# Patient Record
Sex: MALE | Race: WHITE | NOT HISPANIC OR LATINO | Employment: FULL TIME | URBAN - METROPOLITAN AREA
[De-identification: names, ages, dates, MRNs, and addresses within clinical notes are randomized per-mention and may not be internally consistent; named-entity substitution may affect disease eponyms.]

---

## 2017-01-10 DIAGNOSIS — D64.9 ANEMIA, UNSPECIFIED: ICD-10-CM

## 2017-01-10 LAB
ERYTHROCYTE [DISTWIDTH] IN BLOOD BY AUTOMATED COUNT: 14.5 % (ref 10–15)
FERRITIN SERPL-MCNC: 16 NG/ML (ref 26–388)
FOLATE SERPL-MCNC: 35.7 NG/ML
HCT VFR BLD AUTO: 44 % (ref 40–53)
HGB BLD-MCNC: 14.1 G/DL (ref 13.3–17.7)
IRON SATN MFR SERPL: 19 % (ref 15–46)
IRON SERPL-MCNC: 64 UG/DL (ref 35–180)
MCH RBC QN AUTO: 28.1 PG (ref 26.5–33)
MCHC RBC AUTO-ENTMCNC: 32 G/DL (ref 31.5–36.5)
MCV RBC AUTO: 88 FL (ref 78–100)
PLATELET # BLD AUTO: 267 10E9/L (ref 150–450)
RBC # BLD AUTO: 5.01 10E12/L (ref 4.4–5.9)
RETICS # AUTO: 43.9 10E9/L (ref 25–95)
RETICS/RBC NFR AUTO: 0.9 % (ref 0.5–2)
TIBC SERPL-MCNC: 337 UG/DL (ref 240–430)
VIT B12 SERPL-MCNC: 636 PG/ML (ref 193–986)
WBC # BLD AUTO: 5.8 10E9/L (ref 4–11)

## 2017-01-10 PROCEDURE — 82728 ASSAY OF FERRITIN: CPT | Performed by: INTERNAL MEDICINE

## 2017-01-10 PROCEDURE — 84165 PROTEIN E-PHORESIS SERUM: CPT | Performed by: INTERNAL MEDICINE

## 2017-01-10 PROCEDURE — 82746 ASSAY OF FOLIC ACID SERUM: CPT | Performed by: INTERNAL MEDICINE

## 2017-01-10 PROCEDURE — 00000402 ZZHCL STATISTIC TOTAL PROTEIN: Performed by: INTERNAL MEDICINE

## 2017-01-10 PROCEDURE — 85045 AUTOMATED RETICULOCYTE COUNT: CPT | Performed by: INTERNAL MEDICINE

## 2017-01-10 PROCEDURE — 83540 ASSAY OF IRON: CPT | Performed by: INTERNAL MEDICINE

## 2017-01-10 PROCEDURE — 83550 IRON BINDING TEST: CPT | Performed by: INTERNAL MEDICINE

## 2017-01-10 PROCEDURE — 36415 COLL VENOUS BLD VENIPUNCTURE: CPT | Performed by: INTERNAL MEDICINE

## 2017-01-10 PROCEDURE — 82607 VITAMIN B-12: CPT | Performed by: INTERNAL MEDICINE

## 2017-01-10 PROCEDURE — 85027 COMPLETE CBC AUTOMATED: CPT | Performed by: INTERNAL MEDICINE

## 2017-01-11 LAB
ALBUMIN SERPL ELPH-MCNC: 4.4 G/DL (ref 3.7–5.1)
ALPHA1 GLOB SERPL ELPH-MCNC: 0.3 G/DL (ref 0.2–0.4)
ALPHA2 GLOB SERPL ELPH-MCNC: 0.6 G/DL (ref 0.5–0.9)
B-GLOBULIN SERPL ELPH-MCNC: 0.8 G/DL (ref 0.6–1)
GAMMA GLOB SERPL ELPH-MCNC: 1.1 G/DL (ref 0.7–1.6)
M PROTEIN SERPL ELPH-MCNC: 0 G/DL
PROT PATTERN SERPL ELPH-IMP: NORMAL

## 2017-01-12 ENCOUNTER — TELEPHONE (OUTPATIENT)
Dept: FAMILY MEDICINE | Facility: CLINIC | Age: 52
End: 2017-01-12

## 2017-01-12 DIAGNOSIS — D50.0 IRON DEFICIENCY ANEMIA DUE TO CHRONIC BLOOD LOSS: Primary | ICD-10-CM

## 2017-01-12 NOTE — TELEPHONE ENCOUNTER
Can we call Felix Coelho and let him know that     Luther Washington,    I have had the opportunity to review your recent results and an interpretation is as follows:  Your follow up complete blood counts and iron studies again show iron deficiency anemia as ferritin remains low.  At this time I would recommend a follow up colonoscopy and upper endoscopy and will give you a referral to gastroenterology to schedule this.   [facility] (for [place] and Alyssa [place]ell, please use the selections above) - [place] [phone]   [email or web address]      Sincerely,  Gerson Lowery MD

## 2017-01-17 NOTE — TELEPHONE ENCOUNTER
Patient has viewed results and PCP's message in [facility].  I called and left a reminder message, as well as phone number to [facility].  Candis Reynaga RN

## 2017-01-18 NOTE — TELEPHONE ENCOUNTER
PCP: FYI    Patient called back to let PCP know that he is scheduled with gastroenterology and he appreciates the calls.    Gabby Angel RN

## 2017-01-23 ENCOUNTER — MYC MEDICAL ADVICE (OUTPATIENT)
Dept: FAMILY MEDICINE | Facility: CLINIC | Age: 52
End: 2017-01-23

## 2017-01-23 DIAGNOSIS — Z12.11 SPECIAL SCREENING FOR MALIGNANT NEOPLASMS, COLON: Primary | ICD-10-CM

## 2017-02-07 ENCOUNTER — TRANSFERRED RECORDS (OUTPATIENT)
Dept: HEALTH INFORMATION MANAGEMENT | Facility: CLINIC | Age: 52
End: 2017-02-07

## 2018-03-06 NOTE — PROGRESS NOTES
Quick Note:    Luther Washington,    I have had the opportunity to review your recent results and an interpretation is as follows:  Your follow up complete blood counts and iron studies again show iron deficiency anemia as ferritin remains low.  At this time I would recommend a follow up colonoscopy and upper endoscopy and will give you a referral to gastroenterology to schedule this. [facility] (for [address] and [place]lyssa Howell, please use the selections above) - [place] [phone] [email or web address]     Sincerely,  Gerson Lowery MD  ______   Yes

## 2018-03-29 DIAGNOSIS — J30.2 CHRONIC SEASONAL ALLERGIC RHINITIS, UNSPECIFIED TRIGGER: Primary | ICD-10-CM

## 2018-03-30 NOTE — TELEPHONE ENCOUNTER
Left VM. NEED OV WITH PCP      Last Written Prescription Date:  11/16/2016  Last Fill Quantity: 90,  # refills: 3   Last office visit: 11/16/2016 with prescribing provider:     Future Office Visit:      Requested Prescriptions   Pending Prescriptions Disp Refills     ALLEGRA-D ALLERGY & CONGESTION  MG per 12 hr tablet [Pharmacy Med Name: ALLEGRA-D 12HR TABLETS 30'S (OTC)] 90 tablet 0     Sig: TAKE 1 TABLET BY MOUTH DAILY.    There is no refill protocol information for this order

## 2018-04-03 RX ORDER — FEXOFENADINE HYDROCHLORIDE AND PSEUDOEPHEDRINE HYDROCHLORIDE 60; 120 MG/1; MG/1
TABLET, FILM COATED, EXTENDED RELEASE ORAL
Qty: 30 TABLET | Refills: 0 | Status: SHIPPED | OUTPATIENT
Start: 2018-04-03 | End: 2018-04-27

## 2018-04-03 NOTE — TELEPHONE ENCOUNTER
Routing refill request to provider for review/approval because:  Drug not on the [facility] refill protocol controlled substance    Paola Whitehead, RN  [employer]

## 2018-04-27 ENCOUNTER — OFFICE VISIT (OUTPATIENT)
Dept: FAMILY MEDICINE | Facility: CLINIC | Age: 53
End: 2018-04-27
Payer: COMMERCIAL

## 2018-04-27 ENCOUNTER — TELEPHONE (OUTPATIENT)
Dept: FAMILY MEDICINE | Facility: CLINIC | Age: 53
End: 2018-04-27

## 2018-04-27 DIAGNOSIS — R73.01 IFG (IMPAIRED FASTING GLUCOSE): ICD-10-CM

## 2018-04-27 DIAGNOSIS — R07.0 THROAT PAIN: ICD-10-CM

## 2018-04-27 DIAGNOSIS — Z00.00 ROUTINE GENERAL MEDICAL EXAMINATION AT A HEALTH CARE FACILITY: Primary | ICD-10-CM

## 2018-04-27 DIAGNOSIS — J30.2 CHRONIC SEASONAL ALLERGIC RHINITIS, UNSPECIFIED TRIGGER: ICD-10-CM

## 2018-04-27 DIAGNOSIS — K12.0 ORAL APHTHAE: ICD-10-CM

## 2018-04-27 DIAGNOSIS — D50.0 IRON DEFICIENCY ANEMIA DUE TO CHRONIC BLOOD LOSS: ICD-10-CM

## 2018-04-27 PROBLEM — D36.9 TUBULAR ADENOMA: Status: ACTIVE | Noted: 2018-04-27

## 2018-04-27 LAB
ALBUMIN SERPL-MCNC: 3.9 G/DL (ref 3.4–5)
ALP SERPL-CCNC: 66 U/L (ref 40–150)
ALT SERPL W P-5'-P-CCNC: 27 U/L (ref 0–70)
ANION GAP SERPL CALCULATED.3IONS-SCNC: 6 MMOL/L (ref 3–14)
AST SERPL W P-5'-P-CCNC: 14 U/L (ref 0–45)
BILIRUB SERPL-MCNC: 0.4 MG/DL (ref 0.2–1.3)
BUN SERPL-MCNC: 12 MG/DL (ref 7–30)
CALCIUM SERPL-MCNC: 8.9 MG/DL (ref 8.5–10.1)
CHLORIDE SERPL-SCNC: 107 MMOL/L (ref 94–109)
CHOLEST SERPL-MCNC: 204 MG/DL
CO2 SERPL-SCNC: 29 MMOL/L (ref 20–32)
CREAT SERPL-MCNC: 1.05 MG/DL (ref 0.66–1.25)
DEPRECATED S PYO AG THROAT QL EIA: NORMAL
ERYTHROCYTE [DISTWIDTH] IN BLOOD BY AUTOMATED COUNT: 14.1 % (ref 10–15)
FERRITIN SERPL-MCNC: 46 NG/ML (ref 26–388)
GFR SERPL CREATININE-BSD FRML MDRD: 75 ML/MIN/1.7M2
GLUCOSE SERPL-MCNC: 99 MG/DL (ref 70–99)
HBA1C MFR BLD: 5.6 % (ref 0–5.6)
HCT VFR BLD AUTO: 44.9 % (ref 40–53)
HDLC SERPL-MCNC: 44 MG/DL
HGB BLD-MCNC: 14.7 G/DL (ref 13.3–17.7)
IRON SATN MFR SERPL: 22 % (ref 15–46)
IRON SERPL-MCNC: 67 UG/DL (ref 35–180)
LDLC SERPL CALC-MCNC: 134 MG/DL
MCH RBC QN AUTO: 30.1 PG (ref 26.5–33)
MCHC RBC AUTO-ENTMCNC: 32.7 G/DL (ref 31.5–36.5)
MCV RBC AUTO: 92 FL (ref 78–100)
NONHDLC SERPL-MCNC: 160 MG/DL
PLATELET # BLD AUTO: 291 10E9/L (ref 150–450)
POTASSIUM SERPL-SCNC: 4.6 MMOL/L (ref 3.4–5.3)
PROT SERPL-MCNC: 7.5 G/DL (ref 6.8–8.8)
PSA SERPL-ACNC: 2.9 UG/L (ref 0–4)
RBC # BLD AUTO: 4.88 10E12/L (ref 4.4–5.9)
SODIUM SERPL-SCNC: 142 MMOL/L (ref 133–144)
SPECIMEN SOURCE: NORMAL
TIBC SERPL-MCNC: 303 UG/DL (ref 240–430)
TRIGL SERPL-MCNC: 129 MG/DL
WBC # BLD AUTO: 7 10E9/L (ref 4–11)

## 2018-04-27 PROCEDURE — 83036 HEMOGLOBIN GLYCOSYLATED A1C: CPT | Performed by: INTERNAL MEDICINE

## 2018-04-27 PROCEDURE — 83550 IRON BINDING TEST: CPT | Performed by: INTERNAL MEDICINE

## 2018-04-27 PROCEDURE — 87081 CULTURE SCREEN ONLY: CPT | Performed by: INTERNAL MEDICINE

## 2018-04-27 PROCEDURE — 87880 STREP A ASSAY W/OPTIC: CPT | Performed by: INTERNAL MEDICINE

## 2018-04-27 PROCEDURE — 80053 COMPREHEN METABOLIC PANEL: CPT | Performed by: INTERNAL MEDICINE

## 2018-04-27 PROCEDURE — 85027 COMPLETE CBC AUTOMATED: CPT | Performed by: INTERNAL MEDICINE

## 2018-04-27 PROCEDURE — 99396 PREV VISIT EST AGE 40-64: CPT | Performed by: INTERNAL MEDICINE

## 2018-04-27 PROCEDURE — G0103 PSA SCREENING: HCPCS | Performed by: INTERNAL MEDICINE

## 2018-04-27 PROCEDURE — 82728 ASSAY OF FERRITIN: CPT | Performed by: INTERNAL MEDICINE

## 2018-04-27 PROCEDURE — 80061 LIPID PANEL: CPT | Performed by: INTERNAL MEDICINE

## 2018-04-27 PROCEDURE — 83540 ASSAY OF IRON: CPT | Performed by: INTERNAL MEDICINE

## 2018-04-27 PROCEDURE — 36415 COLL VENOUS BLD VENIPUNCTURE: CPT | Performed by: INTERNAL MEDICINE

## 2018-04-27 RX ORDER — FEXOFENADINE HCL AND PSEUDOEPHEDRINE HCI 60; 120 MG/1; MG/1
TABLET, EXTENDED RELEASE ORAL
Qty: 90 TABLET | Refills: 3 | Status: SHIPPED | OUTPATIENT
Start: 2018-04-27 | End: 2020-08-25

## 2018-04-27 RX ORDER — TRIAMCINOLONE ACETONIDE 0.1 %
PASTE (GRAM) DENTAL
Qty: 15 G | Refills: 2 | Status: SHIPPED | OUTPATIENT
Start: 2018-04-27 | End: 2020-08-25

## 2018-04-27 NOTE — TELEPHONE ENCOUNTER
I forgot to record BP in chart this morning so left a message asking pt if he recalls.      Brit JEAN MA

## 2018-04-27 NOTE — PROGRESS NOTES
SUBJECTIVE:   CC: Felix Coelho is an 52 year old male who presents for preventative health visit.     Healthy Habits:    Do you get at least three servings of calcium containing foods daily (dairy, green leafy vegetables, etc.)? yes    Amount of exercise or daily activities, outside of work: 4 day(s) per week    Problems taking medications regularly No    Medication side effects: No    Have you had an eye exam in the past two years? no    Do you see a dentist twice per year? yes    Do you have sleep apnea, excessive snoring or daytime drowsiness?no      Today's PHQ-2 Score:   PHQ-2 ( 1999 [employer]) 11/16/2016 11/14/2016   Q1: Little interest or pleasure in doing things 0 -   Q2: Feeling down, depressed or hopeless 0 -   PHQ-2 Score 0 -   Q1: Little interest or pleasure in doing things - Not at all   Q2: Feeling down, depressed or hopeless - Not at all   PHQ-2 Score - 0       Abuse: Current or Past(Physical, Sexual or Emotional)- No  Do you feel safe in your environment - Yes    Social History   Substance Use Topics     Smoking status: Never Smoker     Smokeless tobacco: Never Used     Alcohol use 0.0 oz/week     0 Standard drinks or equivalent per week      Comment: 1 glass of wine 3-4 times a week      If you drink alcohol do you typically have >3 drinks per day or >7 drinks per week? No                      Last PSA:   PSA   Date Value Ref Range Status   11/04/2016 2.05 0 - 4 ug/L Final     Comment:     Assay Method:  Chemiluminescence using Siemens Vista analyzer       Reviewed orders with patient. 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  No associated coughing or wheezing.  No fevers or chills.  Tylenol helps.  Gastroesophageal reflux does not seem to be triggering.  No other oral ulcers.  Wife has chest cough and 12 year old had a recent head cold    ROS:  C: NEGATIVE for fever, chills, change in weight  I: NEGATIVE for worrisome rashes, moles or lesions  E: NEGATIVE for vision changes or irritation  ENT: as above   R: NEGATIVE for significant cough or SOB  CV: NEGATIVE for chest pain, palpitations or peripheral edema  GI: NEGATIVE for nausea, abdominal pain, heartburn, or change in bowel habits   male: negative for dysuria, hematuria, decreased urinary stream, erectile dysfunction, urethral discharge  M: NEGATIVE for significant arthralgias or myalgia  N: NEGATIVE for weakness, dizziness or paresthesias  P: NEGATIVE for changes in mood or affect    OBJECTIVE:   Pulse 91  Ht 6' 2\" (1.88 m)  Wt 205 lb (93 kg)  SpO2 97%  BMI 26.32 kg/m2  EXAM:  GENERAL: healthy, alert and no distress  EYES: Eyes grossly normal to inspection, PERRL and conjunctivae and sclerae normal  HENT: ear canals and TM's normal, nose and mouth without ulcers or lesions  NECK: no adenopathy, no asymmetry, masses, or scars and thyroid normal to palpation  RESP: lungs clear to auscultation - no rales, rhonchi or wheezes  CV: regular rate and rhythm, normal S1 S2, no S3 or S4, no murmur, click or rub, no peripheral edema and peripheral pulses strong  ABDOMEN: soft, nontender, no hepatosplenomegaly, no masses and bowel sounds normal  RECTAL: normal sphincter tone, no rectal masses, prostate normal size, smooth, nontender without nodules or masses  MS: no gross musculoskeletal defects noted, no edema  SKIN: no suspicious lesions or rashes  NEURO: Normal strength and tone, mentation intact and speech normal  PSYCH: mentation appears normal, " "affect normal/bright    ASSESSMENT/PLAN:   (Z00.00) Routine general medical examination at a health care facility  (primary encounter diagnosis)  Comment: For routine exam, we will draw labs as ordered, cholesterol, diabetes mellitus check, liver function, renal function, PSA and refer for colonoscopy.  We will also update vaccination history.  Shingrix vaccine is now available.  Check with your insurance to see if it is covered  Plan: CBC with platelets, Lipid panel reflex to         direct LDL Fasting, Prostate spec antigen         screen, Comprehensive metabolic panel            (J30.2) Chronic seasonal allergic rhinitis, unspecified trigger  Comment:   Plan: fexofenadine-pseudoePHEDrine (ALLEGRA-D ALLERGY        & CONGESTION)  MG per 12 hr tablet            (K12.0) Oral aphthae  Comment:   Plan: triamcinolone (KENALOG) 0.1 % paste            (R73.01) IFG (impaired fasting glucose)  Comment: check hemoglobin A1c ( 3 month average blood glucose)  Plan:     (D50.0) Iron deficiency anemia due to chronic blood loss  Comment: Recheck ferritin today and iron levels  Plan:     (R07.0) Throat pain  Comment: recommend strep test though  Plan: Strep, Rapid Screen                   COUNSELING:  Reviewed preventive health counseling, as reflected in patient instructions       reports that he has never smoked. He has never used smokeless tobacco.    Estimated body mass index is 25.94 kg/(m^2) as calculated from the following:    Height as of 11/16/16: 6' 2\" (1.88 m).    Weight as of 11/16/16: 202 lb (91.6 kg).       Counseling Resources:  ATP IV Guidelines  Pooled Cohorts Equation Calculator  FRAX Risk Assessment  ICSI Preventive Guidelines  Dietary Guidelines for Americans, 2010  USDA's MyPlate  ASA Prophylaxis  Lung CA Screening    Gerson Lowery MD, MD  [facility]  "

## 2018-04-27 NOTE — MR AVS SNAPSHOT
After Visit Summary   4/27/2018    Felix Coelho    MRN: [id number]           Patient Information     Date Of Birth          1965        Visit Information        Provider Department      4/27/2018 7:30 AM Gerson Lowery MD [facility]        Today's Diagnoses     Routine general medical examination at a health care facility    -  1    Chronic seasonal allergic rhinitis, unspecified trigger        Oral aphthae        IFG (impaired fasting glucose)        Iron deficiency anemia due to chronic blood loss        Throat pain          Care Instructions      Preventive Health Recommendations  Male Ages 50 - 64    Yearly exam:             See your health care provider every year in order to  o   Review health changes.   o   Discuss preventive care.    o   Review your medicines if your doctor has prescribed any.     Have a cholesterol test every 5 years, or more frequently if you are at risk for high cholesterol/heart disease.     Have a diabetes test (fasting glucose) every three years. If you are at risk for diabetes, you should have this test more often.     Have a colonoscopy at age 50, or have a yearly FIT test (stool test). These exams will check for colon cancer.      Talk with your health care provider about whether or not a prostate cancer screening test (PSA) is right for you.    You should be tested each year for STDs (sexually transmitted diseases), if you re at risk.     Shots: Get a flu shot each year. Get a tetanus shot every 10 years.     Nutrition:    Eat at least 5 servings of fruits and vegetables daily.     Eat whole-grain bread, whole-wheat pasta and brown rice instead of white grains and rice.     Talk to your provider about Calcium and Vitamin D.     Lifestyle    Exercise for at least 150 minutes a week (30 minutes a day, 5 days a week). This will help you control your weight and prevent disease.     Limit alcohol to one drink per day.     No smoking.      Wear sunscreen to prevent skin cancer.     See your dentist every six months for an exam and cleaning.     See your eye doctor every 1 to 2 years.  (Z00.00) Routine general medical examination at a health care facility  (primary encounter diagnosis)  Comment: For routine exam, we will draw labs as ordered, cholesterol, diabetes mellitus check, liver function, renal function, PSA and refer for colonoscopy.  We will also update vaccination history.  Shingrix vaccine is now available.  Check with your insurance to see if it is covered  Plan: CBC with platelets, Lipid panel reflex to         direct LDL Fasting, Prostate spec antigen         screen, Comprehensive metabolic panel            (J30.2) Chronic seasonal allergic rhinitis, unspecified trigger  Comment:   Plan: fexofenadine-pseudoePHEDrine (ALLEGRA-D ALLERGY        & CONGESTION)  MG per 12 hr tablet            (K12.0) Oral aphthae  Comment:   Plan: triamcinolone (KENALOG) 0.1 % paste            (R73.01) IFG (impaired fasting glucose)  Comment: check hemoglobin A1c ( 3 month average blood glucose)  Plan:     (D50.0) Iron deficiency anemia due to chronic blood loss  Comment: Recheck ferritin today and iron levels  Plan:     (R07.0) Throat pain  Comment: recommend strep test though  Plan: Strep, Rapid Screen                     Follow-ups after your visit        Who to contact     If you have questions or need follow up information about today's clinic visit or your schedule please contact [facility] directly at [phone].  Normal or non-critical lab and imaging results will be communicated to you by MyChart, letter or phone within 4 business days after the clinic has received the results. If you do not hear from us within 7 days, please contact the clinic through MyChart or phone. If you have a critical or abnormal lab result, we will notify you by phone as soon as possible.  Submit refill requests through [employer] or call your pharmacy and  "they will forward the refill request to us. Please allow 3 business days for your refill to be completed.          Additional Information About Your Visit        [email or web address] Information     [facility] gives you secure access to your electronic health record. If you see a primary care provider, you can also send messages to your care team and make appointments. If you have questions, please call your primary care clinic.  If you do not have a primary care provider, please call [phone] and they will assist you.        Care EveryWhere ID     This is your Care EveryWhere ID. This could be used by other organizations to access your [facility] medical records  [id number]        Your Vitals Were     Pulse Height Pulse Oximetry BMI (Body Mass Index)          91 6' 2\" (1.88 m) 97% 26.32 kg/m2         Blood Pressure from Last 3 Encounters:   11/16/16 151/89   08/10/15 102/68   07/30/13 132/83    Weight from Last 3 Encounters:   04/27/18 205 lb (93 kg)   11/16/16 202 lb (91.6 kg)   08/10/15 199 lb 12.8 oz (90.6 kg)              We Performed the Following     CBC with platelets     Comprehensive metabolic panel     Ferritin     Hemoglobin A1c     Iron and iron binding capacity     Lipid panel reflex to direct LDL Fasting     Prostate spec antigen screen     Strep, Rapid Screen          Today's Medication Changes          These changes are accurate as of 4/27/18  8:03 AM.  If you have any questions, ask your nurse or doctor.               These medicines have changed or have updated prescriptions.        Dose/Directions    fexofenadine-pseudoePHEDrine  MG per 12 hr tablet   Commonly known as:  ALLEGRA-D ALLERGY & CONGESTION   This may have changed:  See the new instructions.   Used for:  Chronic seasonal allergic rhinitis, unspecified trigger   Changed by:  Gerson Lowery MD        TAKE 1 TABLET BY MOUTH DAILY.   Quantity:  90 tablet   Refills:  3            Where to get your medicines      These medications were " sent to [facility] [address] [place], [place] - [address] [place] [facility] [address], [facility] [address]     Phone:  [phone]     triamcinolone 0.1 % paste         Some of these will need a paper prescription and others can be bought over the counter.  Ask your nurse if you have questions.     Bring a paper prescription for each of these medications     fexofenadine-pseudoePHEDrine  MG per 12 hr tablet                Primary Care Provider Office Phone # Fax #    Gerson Lowery [phone] [address] [address]        Equal Access to Services     SHELBY RILEY : Hadii aad ku hadasho Sojomarali, waaxda luqadaha, qaybta kaalmada adeegyada, jr nix. So [facility] [phone].    ATENCIÓN: Si habla español, tiene a villatoro disposición servicios gratuitos de asistencia lingüística. [facility] [phone].    We comply with applicable federal civil rights laws and [place] laws. We do not discriminate on the basis of race, color, national origin, age, disability, sex, sexual orientation, or gender identity.            Thank you!     Thank you for choosing [facility]  for your care. Our goal is always to provide you with excellent care. Hearing back from our patients is one way we can continue to improve our services. Please take a few minutes to complete the written survey that you may receive in the mail after your visit with us. Thank you!             Your Updated Medication List - Protect others around you: Learn how to safely use, store and throw away your medicines at www.disposemymeds.org.          This list is accurate as of 4/27/18  8:03 AM.  Always use your most recent med list.                   Brand Name Dispense Instructions for use Diagnosis    ALEVE 220 MG tablet   Generic drug:  naproxen sodium      Take 440 mg by mouth every 12 hours as needed.    Preop general physical  exam       fexofenadine-pseudoePHEDrine  MG per 12 hr tablet    ALLEGRA-D ALLERGY & CONGESTION    90 tablet    TAKE 1 TABLET BY MOUTH DAILY.    Chronic seasonal allergic rhinitis, unspecified trigger       ibuprofen 200 MG capsule      Take 200 mg by mouth as needed for fever.        MULTIVITAMINS PO      Take 1 tablet by mouth three times a week.    Preop general physical exam       triamcinolone 0.1 % paste    KENALOG    15 g    use as directed for canker sores    Oral aphthae

## 2018-04-27 NOTE — PROGRESS NOTES
Luther Washington,    I had the opportunity to review your recent labs and a summary of your labs reads as follows:    Your complete blood counts show no sign of anemia, normal white blood cell count and platelets.  Your iron studies also returned within normal limits  Your comprehensive metabolic panel showed normal renal function, normal liver function, and normal fasting blood glucose indicating no evidence of diabetes mellitus.  Your fasting lipid panel show  - normal HDL (good) cholesterol -as your goal is greater than 40  - low LDL (bad) cholesterol as your goal is less than 160  - normal triglyceride levels  Your PSA level is stable indicating no evidence of prostate cancer   Your strep test came back negative as well -for now I would recommend conservative treatment      Congratulaions on your excellent results         Sincerely,  Gerson Lowery MD

## 2018-04-27 NOTE — PATIENT INSTRUCTIONS
Preventive Health Recommendations  Male Ages 50 - 64    Yearly exam:             See your health care provider every year in order to  o   Review health changes.   o   Discuss preventive care.    o   Review your medicines if your doctor has prescribed any.     Have a cholesterol test every 5 years, or more frequently if you are at risk for high cholesterol/heart disease.     Have a diabetes test (fasting glucose) every three years. If you are at risk for diabetes, you should have this test more often.     Have a colonoscopy at age 50, or have a yearly FIT test (stool test). These exams will check for colon cancer.      Talk with your health care provider about whether or not a prostate cancer screening test (PSA) is right for you.    You should be tested each year for STDs (sexually transmitted diseases), if you re at risk.     Shots: Get a flu shot each year. Get a tetanus shot every 10 years.     Nutrition:    Eat at least 5 servings of fruits and vegetables daily.     Eat whole-grain bread, whole-wheat pasta and brown rice instead of white grains and rice.     Talk to your provider about Calcium and Vitamin D.     Lifestyle    Exercise for at least 150 minutes a week (30 minutes a day, 5 days a week). This will help you control your weight and prevent disease.     Limit alcohol to one drink per day.     No smoking.     Wear sunscreen to prevent skin cancer.     See your dentist every six months for an exam and cleaning.     See your eye doctor every 1 to 2 years.  (Z00.00) Routine general medical examination at a health care facility  (primary encounter diagnosis)  Comment: For routine exam, we will draw labs as ordered, cholesterol, diabetes mellitus check, liver function, renal function, PSA and refer for colonoscopy.  We will also update vaccination history.  Shingrix vaccine is now available.  Check with your insurance to see if it is covered  Plan: CBC with platelets, Lipid panel reflex to         direct  LDL Fasting, Prostate spec antigen         screen, Comprehensive metabolic panel            (J30.2) Chronic seasonal allergic rhinitis, unspecified trigger  Comment:   Plan: fexofenadine-pseudoePHEDrine (ALLEGRA-D ALLERGY        & CONGESTION)  MG per 12 hr tablet            (K12.0) Oral aphthae  Comment:   Plan: triamcinolone (KENALOG) 0.1 % paste            (R73.01) IFG (impaired fasting glucose)  Comment: check hemoglobin A1c ( 3 month average blood glucose)  Plan:     (D50.0) Iron deficiency anemia due to chronic blood loss  Comment: Recheck ferritin today and iron levels  Plan:     (R07.0) Throat pain  Comment: recommend strep test though  Plan: Strep, Rapid Screen

## 2018-04-28 LAB
BACTERIA SPEC CULT: NORMAL
SPECIMEN SOURCE: NORMAL

## 2018-04-30 VITALS
BODY MASS INDEX: 26.31 KG/M2 | HEIGHT: 74 IN | HEART RATE: 91 BPM | SYSTOLIC BLOOD PRESSURE: 121 MMHG | DIASTOLIC BLOOD PRESSURE: 98 MMHG | OXYGEN SATURATION: 97 % | WEIGHT: 205 LBS

## 2020-02-10 ENCOUNTER — HEALTH MAINTENANCE LETTER (OUTPATIENT)
Age: 55
End: 2020-02-10

## 2020-05-15 ENCOUNTER — OFFICE VISIT (OUTPATIENT)
Dept: FAMILY MEDICINE | Facility: CLINIC | Age: 55
End: 2020-05-15
Payer: COMMERCIAL

## 2020-05-15 VITALS
OXYGEN SATURATION: 100 % | HEIGHT: 74 IN | HEART RATE: 71 BPM | DIASTOLIC BLOOD PRESSURE: 88 MMHG | BODY MASS INDEX: 26.62 KG/M2 | WEIGHT: 207.4 LBS | SYSTOLIC BLOOD PRESSURE: 132 MMHG

## 2020-05-15 DIAGNOSIS — N50.89 MASS OF LEFT TESTICLE: Primary | ICD-10-CM

## 2020-05-15 PROCEDURE — 99214 OFFICE O/P EST MOD 30 MIN: CPT | Mod: 95 | Performed by: INTERNAL MEDICINE

## 2020-05-15 ASSESSMENT — MIFFLIN-ST. JEOR: SCORE: 1860.51

## 2020-05-15 NOTE — PROGRESS NOTES
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  History of left inguinal hernia.  1st cousin on mothers side with possible germ cell tumor.    Patient Active Problem List   Diagnosis     Chronic rhinitis     Oral aphthae     External hemorrhoids     Kidney stone     CARDIOVASCULAR SCREENING; LDL GOAL LESS THAN 130     Left inguinal hernia     Positive Test for Malignant Hyperthermia     IFG (impaired fasting glucose)     Iron deficiency anemia due to chronic blood loss     Tubular adenoma     No past surgical history on file.    Social History     Tobacco Use     Smoking status: Never Smoker     Smokeless tobacco: Never Used   Substance Use Topics     Alcohol use: Yes     Alcohol/week: 0.0 standard drinks     Comment: 1 glass of wine 2-3 times a week     Family History   Problem Relation Age of Onset     C.A.D. Father 55        triple bi pass surgery  at age 55     Depression Sister      Cardiovascular Paternal Uncle         malignant hyperthermia     Cancer Paternal Grandfather         penile     Cancer Maternal Grandfather         neck         Current Outpatient Medications   Medication Sig Dispense Refill     fexofenadine-pseudoePHEDrine (ALLEGRA-D ALLERGY & CONGESTION)  MG per 12 hr tablet TAKE 1 TABLET BY MOUTH DAILY. 90 tablet 3     Ibuprofen 200 MG capsule Take 200 mg by mouth as needed for fever.       Multiple Vitamin (MULTIVITAMINS PO) Take 1 tablet by mouth three times a week.       naproxen sodium (ALEVE) 220 MG tablet Take 440 mg by mouth every 12 hours as needed.       triamcinolone (KENALOG) 0.1 % paste use as directed for canker sores 15 g 2     Allergies   Allergen Reactions     Halothane Other (See Comments)      "Condition is malignant hypothermia          Reviewed and updated as needed this visit by Provider         Review of Systems   No fevers or chills, dysuria or hematuria       Objective    /88 (BP Location: Right arm, Patient Position: Sitting, Cuff Size: Adult Regular)   Pulse 71   Ht 1.88 m (6' 2\")   Wt 94.1 kg (207 lb 6.4 oz)   SpO2 100%   BMI 26.63 kg/m    Body mass index is 26.63 kg/m .  Physical Exam   GEN:  Very healthy appearing man, well nourished, no distress, comfortable  :  Firm mass adjacent to left testes measuring about 0.5 cm X 0.5cm, minimally tender    Ultrasound pending         Assessment & Plan       ICD-10-CM    1. Mass of left testicle  N50.89 US Testicular and Scrotum   Mass of unclear nature by exam  I recommended a scrotal ultrasound within a week to further characterize         Return in about 1 week (around 5/22/2020) for schedule ultrasound within one week.  Further follow will be determined base on ultrasound finding.    Denis Pate MD  [facility]        "

## 2020-05-18 ENCOUNTER — HOSPITAL ENCOUNTER (OUTPATIENT)
Dept: ULTRASOUND IMAGING | Facility: CLINIC | Age: 55
Discharge: HOME | End: 2020-05-18
Attending: INTERNAL MEDICINE | Admitting: INTERNAL MEDICINE
Payer: COMMERCIAL

## 2020-05-18 DIAGNOSIS — N50.89 MASS OF LEFT TESTICLE: ICD-10-CM

## 2020-05-18 DIAGNOSIS — N45.1 EPIDIDYMITIS: Primary | ICD-10-CM

## 2020-05-18 PROCEDURE — 93976 VASCULAR STUDY: CPT

## 2020-05-18 RX ORDER — LEVOFLOXACIN 500 MG/1
500 TABLET, FILM COATED ORAL DAILY
Qty: 10 TABLET | Refills: 0 | Status: SHIPPED | OUTPATIENT
Start: 2020-05-18 | End: 2020-08-25

## 2020-05-18 NOTE — LETTER
May 18, 2020      Felix Coelho  [address] [facility] [address]        Dear ,    We are writing to inform you of your test results.    The following letter pertains to your most recent diagnostic tests:     As we discussed by phone your scrotal symptoms are from an infection within the scrotum called epididymitis.  Take the antibiotic levofloxacin as directed.  Stop the levofloxacin and inform us of any new tendon symptoms while taking levofloxacin.  Call if your symptoms persist upon completing the course of levofloxacin.     Resulted Orders   US Testicular & Scrotum w Doppler Ltd    Narrative    EXAMINATION: US TESTICULAR AND SCROTAL, 5/18/2020 7:56 AM     COMPARISON: None.    HISTORY: Palpable left testicular mass with occasional tenderness for  one week    TECHNIQUE: The scrotum was scanned in standard fashion with  specialized ultrasound transducer(s) using both grey scale and limited  color Doppler techniques.    Findings:  3 mm benign right testicular cyst. Testicles are otherwise normal with  normal arterial blood flow.  The right testicle measures 4.4 x 2.3 x  2.1 cm and the left measures 4.0 x 2.6 x 2.0 cm. There is no evidence  of torsion.    There is no evidence of a significant hydrocele or varicocele.    0.5 cm benign simple cyst in the right epididymal head. 1.3 cm focal  area of thickening and increased vascularity in the left epididymal  tail.      Impression    Impression:   Findings suspicious for left epididymitis    RICK CH MD       If you have any questions or concerns, please call the clinic at the number listed above.       Sincerely,      Denis Pate MD /john

## 2020-05-18 NOTE — RESULT ENCOUNTER NOTE
The following letter pertains to your most recent diagnostic tests:    As we discussed by phone your scrotal symptoms are from an infection within the scrotum called epididymitis.  Take the antibiotic levofloxacin as directed.  Stop the levofloxacin and inform us of any new tendon symptoms while taking levofloxacin.  Call if your symptoms persist upon completing the course of levofloxacin.      Sincerely,    Dr. Pate No complaints

## 2020-05-18 NOTE — LETTER
May 18, 2020      Felix Coelho  [address] [facility] [address]        Dear ,    We are writing to inform you of your test results.    The following letter pertains to your most recent diagnostic tests:     As we discussed by phone your scrotal symptoms are from an infection within the scrotum called epididymitis.  Take the antibiotic levofloxacin as directed.  Stop the levofloxacin and inform us of any new tendon symptoms while taking levofloxacin.  Call if your symptoms persist upon completing the course of levofloxacin.     Resulted Orders   US Testicular & Scrotum w Doppler Ltd    Narrative    EXAMINATION: US TESTICULAR AND SCROTAL, 5/18/2020 7:56 AM     COMPARISON: None.    HISTORY: Palpable left testicular mass with occasional tenderness for  one week    TECHNIQUE: The scrotum was scanned in standard fashion with  specialized ultrasound transducer(s) using both grey scale and limited  color Doppler techniques.    Findings:  3 mm benign right testicular cyst. Testicles are otherwise normal with  normal arterial blood flow.  The right testicle measures 4.4 x 2.3 x  2.1 cm and the left measures 4.0 x 2.6 x 2.0 cm. There is no evidence  of torsion.    There is no evidence of a significant hydrocele or varicocele.    0.5 cm benign simple cyst in the right epididymal head. 1.3 cm focal  area of thickening and increased vascularity in the left epididymal  tail.      Impression    Impression:   Findings suspicious for left epididymitis    RICK CH MD       If you have any questions or concerns, please call the clinic at the number listed above.       Sincerely,        Denis Pate MD / john

## 2020-08-25 ENCOUNTER — OFFICE VISIT (OUTPATIENT)
Dept: FAMILY MEDICINE | Facility: CLINIC | Age: 55
End: 2020-08-25
Payer: COMMERCIAL

## 2020-08-25 VITALS
HEIGHT: 74 IN | OXYGEN SATURATION: 97 % | WEIGHT: 200 LBS | SYSTOLIC BLOOD PRESSURE: 130 MMHG | DIASTOLIC BLOOD PRESSURE: 87 MMHG | BODY MASS INDEX: 25.67 KG/M2 | HEART RATE: 71 BPM | TEMPERATURE: 97 F

## 2020-08-25 DIAGNOSIS — Z00.00 ROUTINE GENERAL MEDICAL EXAMINATION AT A HEALTH CARE FACILITY: Primary | ICD-10-CM

## 2020-08-25 DIAGNOSIS — R73.01 IFG (IMPAIRED FASTING GLUCOSE): ICD-10-CM

## 2020-08-25 DIAGNOSIS — Z23 NEED FOR VACCINATION: ICD-10-CM

## 2020-08-25 DIAGNOSIS — K12.0 ORAL APHTHAE: ICD-10-CM

## 2020-08-25 DIAGNOSIS — J31.0 CHRONIC RHINITIS: ICD-10-CM

## 2020-08-25 DIAGNOSIS — N45.1 EPIDIDYMITIS: ICD-10-CM

## 2020-08-25 LAB
ERYTHROCYTE [DISTWIDTH] IN BLOOD BY AUTOMATED COUNT: 13.8 % (ref 10–15)
HBA1C MFR BLD: 5.4 % (ref 0–5.6)
HCT VFR BLD AUTO: 45.1 % (ref 40–53)
HGB BLD-MCNC: 15.3 G/DL (ref 13.3–17.7)
HIV 1+2 AB+HIV1 P24 AG SERPL QL IA: NONREACTIVE
MCH RBC QN AUTO: 30.5 PG (ref 26.5–33)
MCHC RBC AUTO-ENTMCNC: 33.9 G/DL (ref 31.5–36.5)
MCV RBC AUTO: 90 FL (ref 78–100)
PLATELET # BLD AUTO: 252 10E9/L (ref 150–450)
PSA SERPL-ACNC: 2.89 UG/L (ref 0–4)
RBC # BLD AUTO: 5.01 10E12/L (ref 4.4–5.9)
WBC # BLD AUTO: 10.9 10E9/L (ref 4–11)

## 2020-08-25 PROCEDURE — 99396 PREV VISIT EST AGE 40-64: CPT | Mod: 25 | Performed by: INTERNAL MEDICINE

## 2020-08-25 PROCEDURE — 90471 IMMUNIZATION ADMIN: CPT | Performed by: INTERNAL MEDICINE

## 2020-08-25 PROCEDURE — 80053 COMPREHEN METABOLIC PANEL: CPT | Performed by: INTERNAL MEDICINE

## 2020-08-25 PROCEDURE — 83036 HEMOGLOBIN GLYCOSYLATED A1C: CPT | Performed by: INTERNAL MEDICINE

## 2020-08-25 PROCEDURE — G0103 PSA SCREENING: HCPCS | Performed by: INTERNAL MEDICINE

## 2020-08-25 PROCEDURE — 87389 HIV-1 AG W/HIV-1&-2 AB AG IA: CPT | Performed by: INTERNAL MEDICINE

## 2020-08-25 PROCEDURE — 80061 LIPID PANEL: CPT | Performed by: INTERNAL MEDICINE

## 2020-08-25 PROCEDURE — 85027 COMPLETE CBC AUTOMATED: CPT | Performed by: INTERNAL MEDICINE

## 2020-08-25 PROCEDURE — 36415 COLL VENOUS BLD VENIPUNCTURE: CPT | Performed by: INTERNAL MEDICINE

## 2020-08-25 PROCEDURE — 90714 TD VACC NO PRESV 7 YRS+ IM: CPT | Performed by: INTERNAL MEDICINE

## 2020-08-25 RX ORDER — FEXOFENADINE HCL AND PSEUDOEPHEDRINE HCI 60; 120 MG/1; MG/1
TABLET, EXTENDED RELEASE ORAL
Qty: 90 TABLET | Refills: 3 | Status: SHIPPED | OUTPATIENT
Start: 2020-08-25

## 2020-08-25 RX ORDER — TRIAMCINOLONE ACETONIDE 0.1 %
PASTE (GRAM) DENTAL
Qty: 15 G | Refills: 2 | Status: SHIPPED | OUTPATIENT
Start: 2020-08-25 | End: 2022-05-18

## 2020-08-25 ASSESSMENT — MIFFLIN-ST. JEOR: SCORE: 1821.94

## 2020-08-25 NOTE — NURSING NOTE
Prior to immunization administration, verified patients identity using patient s name and date of birth. Please see Immunization Activity for additional information.     Screening Questionnaire for Adult Immunization    Are you sick today?   No   Do you have allergies to medications, food, a vaccine component or latex?   No   Have you ever had a serious reaction after receiving a vaccination?   No   Do you have a long-term health problem with heart, lung, kidney, or metabolic disease (e.g., diabetes), asthma, a blood disorder, no spleen, complement component deficiency, a cochlear implant, or a spinal fluid leak?  Are you on long-term aspirin therapy?   No   Do you have cancer, leukemia, HIV/AIDS, or any other immune system problem?   No   Do you have a parent, brother, or sister with an immune system problem?   No   In the past 3 months, have you taken medications that affect  your immune system, such as prednisone, other steroids, or anticancer drugs; drugs for the treatment of rheumatoid arthritis, Crohn s disease, or psoriasis; or have you had radiation treatments?   No   Have you had a seizure, or a brain or other nervous system problem?   No   During the past year, have you received a transfusion of blood or blood    products, or been given immune (gamma) globulin or antiviral drug?   No   For women: Are you pregnant or is there a chance you could become       pregnant during the next month?   No   Have you received any vaccinations in the past 4 weeks?   No     Immunization questionnaire answers were all negative.        Per orders of Dr. Lowery, injection of Td given by Monalisa Ferro MA. Patient instructed to remain in clinic for 15 minutes afterwards, and to report any adverse reaction to me immediately.  Patient Verified     Screening performed by Monalisa Ferro MA on 8/25/2020 at 8:16 AM.

## 2020-08-25 NOTE — PATIENT INSTRUCTIONS
(Z00.00) Routine general medical examination at a health care facility  (primary encounter diagnosis)  Comment: For routine exam, we will draw labs as ordered, cholesterol, diabetes mellitus check, liver function, renal function, PSA. We will also update vaccination history.  Plan: HIV Screening, Lipid panel reflex to direct LDL        Fasting, PROSTATE SPEC ANTIGEN SCREEN,         Comprehensive metabolic panel (BMP + Alb, Alk         Phos, ALT, AST, Total. Bili, TP), CBC with         platelets            (K12.0) Oral aphthae  Comment: continue triamcinolone  Plan: triamcinolone (KENALOG) 0.1 % paste            (R73.01) IFG (impaired fasting glucose)  Comment: check hemoglobin A1c today  Plan: Hemoglobin A1c

## 2020-08-25 NOTE — PROGRESS NOTES
SUBJECTIVE:   CC: Felix Coelho is an 55 year old male who presents for preventative health visit.     Healthy Habits:     Getting at least 3 servings of Calcium per day:  Yes    Bi-annual eye exam:  NO    Dental care twice a year:  Yes    Sleep apnea or symptoms of sleep apnea:  None    Diet:  Regular (no restrictions)    Frequency of exercise:  4-5 days/week    Duration of exercise:  15-30 minutes    Taking medications regularly:  Yes    Barriers to taking medications:  None    Medication side effects:  Not applicable    PHQ-2 Total Score: 0    Additional concerns today:  No      Today's PHQ-2 Score:   PHQ-2 ( 1999 Pfizer) 8/24/2020   Q1: Little interest or pleasure in doing things 0   Q2: Feeling down, depressed or hopeless 0   PHQ-2 Score 0   Q1: Little interest or pleasure in doing things Not at all   Q2: Feeling down, depressed or hopeless Not at all   PHQ-2 Score 0       Abuse: Current or Past(Physical, Sexual or Emotional)- No  Do you feel safe in your environment? Yes        Social History     Tobacco Use     Smoking status: Never Smoker     Smokeless tobacco: Never Used   Substance Use Topics     Alcohol use: Yes     Alcohol/week: 0.0 standard drinks     Comment: 1 glass of wine 2-3 times a week     If you drink alcohol do you typically have >3 drinks per day or >7 drinks per week? No    Alcohol Use 8/24/2020   Prescreen: >3 drinks/day or >7 drinks/week? No   Prescreen: >3 drinks/day or >7 drinks/week? -       Last PSA:   PSA   Date Value Ref Range Status   04/27/2018 2.90 0 - 4 ug/L Final     Comment:     Assay Method:  Chemiluminescence using Siemens Vista analyzer       Reviewed orders with patient. Reviewed health maintenance and updated orders accordingly - Yes  Lab work is in process  Labs reviewed in EPIC    Reviewed and updated as needed this visit by clinical staff         Reviewed and updated as needed this visit by Provider        Past Medical History:   Diagnosis Date     Chronic  "rhinitis 8/14/2003     External hemorrhoids 8/7/2008     Kidney stone 9/1/2009     Left inguinal hernia 5/25/2011     Positive Test for Malignant Hyperthermia 5/25/2011      Past Surgical History:   Procedure Laterality Date     BIOPSY  1999    thigh muscle, contracture test for Malignant Hyperthermia, +     EYE SURGERY  1996    PRK for vision care     GENITOURINARY SURGERY  2010    vasectomy     HERNIA REPAIR  2011        Oral aphthae  Routine general medical examination at a health care facility  IFG (impaired fasting glucose)  Chronic rhinitis  Epididymitis   I spoke with Felix with regards to his chronic medical conditions.  He requires refill of triamcinolone for aphthous ulcers.  He is otherwise feeling well.  His blood pressures been well controlled.  His diet and weight are also been stable.  He does need a tetanus shot today    Review of Systems  CONSTITUTIONAL: NEGATIVE for fever, chills, change in weight  INTEGUMENTARY/SKIN: NEGATIVE for worrisome rashes, moles or lesions  EYES: NEGATIVE for vision changes or irritation  ENT: NEGATIVE for ear, mouth and throat problems  RESP: NEGATIVE for significant cough or SOB  CV: NEGATIVE for chest pain, palpitations or peripheral edema  GI: NEGATIVE for nausea, abdominal pain, heartburn, or change in bowel habits   male: negative for dysuria, hematuria, decreased urinary stream, erectile dysfunction, urethral discharge  MUSCULOSKELETAL: NEGATIVE for significant arthralgias or myalgia  NEURO: NEGATIVE for weakness, dizziness or paresthesias  PSYCHIATRIC: NEGATIVE for changes in mood or affect    OBJECTIVE:   /87 (BP Location: Left arm, Patient Position: Sitting, Cuff Size: Adult Regular)   Pulse 71   Temp 97  F (36.1  C) (Temporal)   Ht 1.88 m (6' 2\")   Wt 90.7 kg (200 lb)   SpO2 97%   BMI 25.68 kg/m      Physical Exam  GENERAL: healthy, alert and no distress  EYES: Eyes grossly normal to inspection, PERRL and conjunctivae and sclerae normal  HENT: ear " "canals and TM's normal, nose and mouth without ulcers or lesions  NECK: no adenopathy, no asymmetry, masses, or scars and thyroid normal to palpation  RESP: lungs clear to auscultation - no rales, rhonchi or wheezes  CV: regular rate and rhythm, normal S1 S2, no S3 or S4, no murmur, click or rub, no peripheral edema and peripheral pulses strong  ABDOMEN: soft, nontender, no hepatosplenomegaly, no masses and bowel sounds normal  MS: no gross musculoskeletal defects noted, no edema  SKIN: no suspicious lesions or rashes  NEURO: Normal strength and tone, mentation intact and speech normal  PSYCH: mentation appears normal, affect normal/bright    Diagnostic Test Results:  Labs reviewed in Epic    ASSESSMENT/PLAN:     Patient Instructions   (Z00.00) Routine general medical examination at a health care facility  (primary encounter diagnosis)  Comment: For routine exam, we will draw labs as ordered, cholesterol, diabetes mellitus check, liver function, renal function, PSA. We will also update vaccination history.  Plan: HIV Screening, Lipid panel reflex to direct LDL        Fasting, PROSTATE SPEC ANTIGEN SCREEN,         Comprehensive metabolic panel (BMP + Alb, Alk         Phos, ALT, AST, Total. Bili, TP), CBC with         platelets            (K12.0) Oral aphthae  Comment: continue triamcinolone  Plan: triamcinolone (KENALOG) 0.1 % paste            (R73.01) IFG (impaired fasting glucose)  Comment: check hemoglobin A1c today  Plan: Hemoglobin A1c                  COUNSELING:   Reviewed preventive health counseling, as reflected in patient instructions    Estimated body mass index is 26.63 kg/m  as calculated from the following:    Height as of 5/15/20: 1.88 m (6' 2\").    Weight as of 5/15/20: 94.1 kg (207 lb 6.4 oz).          reports that he has never smoked. He has never used smokeless tobacco.      Counseling Resources:  ATP IV Guidelines  Pooled Cohorts Equation Calculator  FRAX Risk Assessment  ICSI Preventive " Guidelines  Dietary Guidelines for Americans, 2010  USDA's MyPlate  ASA Prophylaxis  Lung CA Screening    Gerson Lowery MD, MD  [facility]

## 2020-08-26 LAB
ALBUMIN SERPL-MCNC: 4 G/DL (ref 3.4–5)
ALP SERPL-CCNC: 60 U/L (ref 40–150)
ALT SERPL W P-5'-P-CCNC: 18 U/L (ref 0–70)
ANION GAP SERPL CALCULATED.3IONS-SCNC: 8 MMOL/L (ref 3–14)
AST SERPL W P-5'-P-CCNC: 17 U/L (ref 0–45)
BILIRUB SERPL-MCNC: 0.5 MG/DL (ref 0.2–1.3)
BUN SERPL-MCNC: 9 MG/DL (ref 7–30)
CALCIUM SERPL-MCNC: 8.9 MG/DL (ref 8.5–10.1)
CHLORIDE SERPL-SCNC: 106 MMOL/L (ref 94–109)
CHOLEST SERPL-MCNC: 240 MG/DL
CO2 SERPL-SCNC: 25 MMOL/L (ref 20–32)
CREAT SERPL-MCNC: 1.18 MG/DL (ref 0.66–1.25)
GFR SERPL CREATININE-BSD FRML MDRD: 70 ML/MIN/{1.73_M2}
GLUCOSE SERPL-MCNC: 100 MG/DL (ref 70–99)
HDLC SERPL-MCNC: 54 MG/DL
LDLC SERPL CALC-MCNC: 150 MG/DL
NONHDLC SERPL-MCNC: 186 MG/DL
POTASSIUM SERPL-SCNC: 4.6 MMOL/L (ref 3.4–5.3)
PROT SERPL-MCNC: 7.7 G/DL (ref 6.8–8.8)
SODIUM SERPL-SCNC: 139 MMOL/L (ref 133–144)
TRIGL SERPL-MCNC: 182 MG/DL

## 2020-08-26 NOTE — RESULT ENCOUNTER NOTE
Luther Washington,    I had the opportunity to review your recent labs and a summary of your labs reads as follows:    Your complete blood counts show no sign of anemia, normal white blood cell count and platelets.  Your comprehensive metabolic panel showed normal renal function, normal liver function, and stable fasting blood glucose indicating no evidence of diabetes mellitus.  Your fasting lipid panel show  - normal HDL (good) cholesterol -as your goal is greater than 40  - rising LDL (bad) cholesterol as your goal is less than 160 - I would recommend a you look into the Mediterranean Diet - typically high in fruits, vegetables, whole grains, beans, nuts, and seeds; include olive oil as an important source of monounsaturated fat; and allow low to moderate wine consumption.  There are also typically low to moderate amounts of fish, poultry, and dairy products, with little red meat in this diet.   - elevated triglyceride levels  Your PSA level is also stable indicating no evidence of prostate cancer     The 10-year ASCVD risk score ([place] KANG Jr., et al., 2013) is: 5.6%    Values used to calculate the score:      Age: 55 years      Sex: Male      Is Non- : No      Diabetic: No      Tobacco smoker: No      Systolic Blood Pressure: 130 mmHg      Is BP treated: No      HDL Cholesterol: 54 mg/dL      Total Cholesterol: 240 mg/dL      Sincerely,  Gerson Lowery MD

## 2020-11-14 ENCOUNTER — HEALTH MAINTENANCE LETTER (OUTPATIENT)
Age: 55
End: 2020-11-14

## 2021-03-22 ENCOUNTER — MYC MEDICAL ADVICE (OUTPATIENT)
Dept: FAMILY MEDICINE | Facility: CLINIC | Age: 56
End: 2021-03-22

## 2021-03-22 DIAGNOSIS — Z13.6 CARDIOVASCULAR SCREENING; LDL GOAL LESS THAN 130: Primary | ICD-10-CM

## 2021-06-23 DIAGNOSIS — Z13.6 CARDIOVASCULAR SCREENING; LDL GOAL LESS THAN 130: ICD-10-CM

## 2021-06-23 LAB
CHOLEST SERPL-MCNC: 228 MG/DL
HDLC SERPL-MCNC: 49 MG/DL
LDLC SERPL CALC-MCNC: 147 MG/DL
NONHDLC SERPL-MCNC: 179 MG/DL
TRIGL SERPL-MCNC: 162 MG/DL

## 2021-06-23 PROCEDURE — 80061 LIPID PANEL: CPT | Performed by: INTERNAL MEDICINE

## 2021-06-23 PROCEDURE — 36415 COLL VENOUS BLD VENIPUNCTURE: CPT | Performed by: INTERNAL MEDICINE

## 2021-06-24 NOTE — RESULT ENCOUNTER NOTE
Luther Washington,    I had the opportunity to review your recent labs and a summary of your labs reads as follows:    Your fasting lipid panel show  - normal HDL (good) cholesterol -as your goal is greater than 40  - low LDL (bad) cholesterol as your goal is less than 160  - improved triglyceride levels    The 10-year ASCVD risk score (Jessica KANG Jr., et al., 2013) is: 5.7%    Values used to calculate the score:      Age: 55 years      Sex: Male      Is Non- : No      Diabetic: No      Tobacco smoker: No      Systolic Blood Pressure: 130 mmHg      Is BP treated: No      HDL Cholesterol: 49 mg/dL      Total Cholesterol: 228 mg/dL    Still, no indication for statin at this time    Sincerely,  Gerson Lowery MD

## 2021-09-12 ENCOUNTER — HEALTH MAINTENANCE LETTER (OUTPATIENT)
Age: 56
End: 2021-09-12

## 2021-11-07 ENCOUNTER — HEALTH MAINTENANCE LETTER (OUTPATIENT)
Age: 56
End: 2021-11-07

## 2022-02-18 NOTE — NURSING NOTE
"Chief Complaint   Patient presents with     Physical     Fasting. Sore throat        Initial BP (!) 121/98  Pulse 91  Ht 6' 2\" (1.88 m)  Wt 205 lb (93 kg)  SpO2 97%  BMI 26.32 kg/m2 Estimated body mass index is 26.32 kg/(m^2) as calculated from the following:    Height as of this encounter: 6' 2\" (1.88 m).    Weight as of this encounter: 205 lb (93 kg).  Medication Reconciliation: complete    "
Rx for fexofenadine-pseudoepherdrine faxed to [facility]  
Chest pain

## 2022-04-04 ENCOUNTER — TRANSFERRED RECORDS (OUTPATIENT)
Dept: HEALTH INFORMATION MANAGEMENT | Facility: CLINIC | Age: 57
End: 2022-04-04
Payer: COMMERCIAL

## 2022-05-15 DIAGNOSIS — K12.0 ORAL APHTHAE: ICD-10-CM

## 2022-05-17 NOTE — TELEPHONE ENCOUNTER
triamcinolone (KENALOG) 0.1 % paste 15 g 2 8/25/2020  No   Sig: use as directed for canker sores PRN   Sent to pharmacy as: Triamcinolone Acetonide 0.1 % Mouth/Throat Paste (KENALOG)   Class: E-Prescribe       Last office visit: 8/25/2020     Future Office Visit:        Routing refill request to provider for review/approval because:  Drug not on the [facility] refill protocol     Samaria Dorman RN, BSN  [facility] Triage   If PCP needs to have team do something (appointment etc ) please route to your team - thank you

## 2022-05-18 RX ORDER — TRIAMCINOLONE ACETONIDE 0.1 %
PASTE (GRAM) DENTAL
Qty: 15 G | Refills: 2 | Status: SHIPPED | OUTPATIENT
Start: 2022-05-18 | End: 2023-06-20

## 2022-10-11 ENCOUNTER — IMMUNIZATION (OUTPATIENT)
Dept: FAMILY MEDICINE | Facility: CLINIC | Age: 57
End: 2022-10-11
Payer: COMMERCIAL

## 2022-10-11 DIAGNOSIS — Z23 HIGH PRIORITY FOR 2019-NCOV VACCINE: Primary | ICD-10-CM

## 2022-10-11 PROCEDURE — 91312 COVID-19,PF,PFIZER BOOSTER BIVALENT: CPT

## 2022-10-11 PROCEDURE — 0124A COVID-19,PF,PFIZER BOOSTER BIVALENT: CPT

## 2022-11-19 ENCOUNTER — HEALTH MAINTENANCE LETTER (OUTPATIENT)
Age: 57
End: 2022-11-19

## 2023-06-20 DIAGNOSIS — K12.0 ORAL APHTHAE: ICD-10-CM

## 2023-06-20 RX ORDER — TRIAMCINOLONE ACETONIDE 0.1 %
PASTE (GRAM) DENTAL
Qty: 15 G | Refills: 2 | Status: SHIPPED | OUTPATIENT
Start: 2023-06-20

## 2024-01-27 ENCOUNTER — HEALTH MAINTENANCE LETTER (OUTPATIENT)
Age: 59
End: 2024-01-27

## 2024-06-21 ENCOUNTER — TELEPHONE (OUTPATIENT)
Dept: FAMILY MEDICINE | Facility: CLINIC | Age: 59
End: 2024-06-21
Payer: COMMERCIAL

## 2024-06-21 NOTE — TELEPHONE ENCOUNTER
Per chart review, pt is already scheduled for an appt in this time frame.     Aug 22, 2024 7:30 AM  LAB with CS LAB  [facility] ([facility] ) [phone]     Aug 26, 2024 4:00 PM  (Arrive by 3:40 PM)  Adult Preventative Visit with Gerson Lowery MD  [facility] ([facility] ) [phone]     Thank you,  Jacqueline Spears, RN

## 2024-06-21 NOTE — TELEPHONE ENCOUNTER
Reason for Call:  Appointment Request    Patient requesting this type of appt:  Preventive     Requested provider: Gerson Lowery    Reason patient unable to be scheduled: Not within requested timeframe    When does patient want to be seen/preferred time: anywhere from 8/19-8/30    Comments: Pt has an appointment on 8/9 for his preventative adult and won't be available. There are no available appt with pcp until December and pt is hoping to get scheduled from 8/19-8/30 if possible. Clinic will have to call pt to schedule an appt.    Could we send this information to you in [place] or would you prefer to receive a phone call?:   Patient would prefer a phone call   Okay to leave a detailed message?: Yes at Cell number on file:    Telephone Information:   Mobile [phone]       Call taken on 6/21/2024 at 8:32 AM by Linda Chang

## 2024-08-13 ENCOUNTER — DOCUMENTATION ONLY (OUTPATIENT)
Dept: LAB | Facility: CLINIC | Age: 59
End: 2024-08-13
Payer: COMMERCIAL

## 2024-08-13 DIAGNOSIS — D50.0 IRON DEFICIENCY ANEMIA DUE TO CHRONIC BLOOD LOSS: ICD-10-CM

## 2024-08-13 DIAGNOSIS — R73.01 IFG (IMPAIRED FASTING GLUCOSE): ICD-10-CM

## 2024-08-13 DIAGNOSIS — Z13.6 CARDIOVASCULAR SCREENING; LDL GOAL LESS THAN 130: Primary | ICD-10-CM

## 2024-08-13 NOTE — PROGRESS NOTES
Patient has an upcomming lab visit without orders. Please place orders as needed.    Patient requesting: None Given    Appointment date: 8/22/2024

## 2024-08-22 ENCOUNTER — LAB (OUTPATIENT)
Dept: LAB | Facility: CLINIC | Age: 59
End: 2024-08-22
Payer: COMMERCIAL

## 2024-08-22 DIAGNOSIS — D50.0 IRON DEFICIENCY ANEMIA DUE TO CHRONIC BLOOD LOSS: ICD-10-CM

## 2024-08-22 DIAGNOSIS — Z13.6 CARDIOVASCULAR SCREENING; LDL GOAL LESS THAN 130: ICD-10-CM

## 2024-08-22 DIAGNOSIS — R73.01 IFG (IMPAIRED FASTING GLUCOSE): ICD-10-CM

## 2024-08-22 LAB
ALBUMIN SERPL BCG-MCNC: 4.1 G/DL (ref 3.5–5.2)
ALP SERPL-CCNC: 60 U/L (ref 40–150)
ALT SERPL W P-5'-P-CCNC: 16 U/L (ref 0–70)
ANION GAP SERPL CALCULATED.3IONS-SCNC: 10 MMOL/L (ref 7–15)
AST SERPL W P-5'-P-CCNC: 20 U/L (ref 0–45)
BILIRUB SERPL-MCNC: 0.4 MG/DL
BUN SERPL-MCNC: 13.4 MG/DL (ref 6–20)
CALCIUM SERPL-MCNC: 8.8 MG/DL (ref 8.8–10.4)
CHLORIDE SERPL-SCNC: 105 MMOL/L (ref 98–107)
CHOLEST SERPL-MCNC: 208 MG/DL
CREAT SERPL-MCNC: 1.2 MG/DL (ref 0.67–1.17)
EGFRCR SERPLBLD CKD-EPI 2021: 71 ML/MIN/1.73M2
ERYTHROCYTE [DISTWIDTH] IN BLOOD BY AUTOMATED COUNT: 13.5 % (ref 10–15)
FASTING STATUS PATIENT QL REPORTED: YES
FASTING STATUS PATIENT QL REPORTED: YES
GLUCOSE SERPL-MCNC: 103 MG/DL (ref 70–99)
HBA1C MFR BLD: 5.6 % (ref 0–5.6)
HCO3 SERPL-SCNC: 25 MMOL/L (ref 22–29)
HCT VFR BLD AUTO: 43.5 % (ref 40–53)
HDLC SERPL-MCNC: 49 MG/DL
HGB BLD-MCNC: 14.1 G/DL (ref 13.3–17.7)
LDLC SERPL CALC-MCNC: 131 MG/DL
MCH RBC QN AUTO: 29.5 PG (ref 26.5–33)
MCHC RBC AUTO-ENTMCNC: 32.4 G/DL (ref 31.5–36.5)
MCV RBC AUTO: 91 FL (ref 78–100)
NONHDLC SERPL-MCNC: 159 MG/DL
PLATELET # BLD AUTO: 241 10E3/UL (ref 150–450)
POTASSIUM SERPL-SCNC: 4.3 MMOL/L (ref 3.4–5.3)
PROT SERPL-MCNC: 6.7 G/DL (ref 6.4–8.3)
PSA SERPL DL<=0.01 NG/ML-MCNC: 4.14 NG/ML (ref 0–3.5)
RBC # BLD AUTO: 4.78 10E6/UL (ref 4.4–5.9)
SODIUM SERPL-SCNC: 140 MMOL/L (ref 135–145)
TRIGL SERPL-MCNC: 141 MG/DL
WBC # BLD AUTO: 5.9 10E3/UL (ref 4–11)

## 2024-08-22 PROCEDURE — 80061 LIPID PANEL: CPT

## 2024-08-22 PROCEDURE — 80053 COMPREHEN METABOLIC PANEL: CPT

## 2024-08-22 PROCEDURE — 83036 HEMOGLOBIN GLYCOSYLATED A1C: CPT

## 2024-08-22 PROCEDURE — G0103 PSA SCREENING: HCPCS

## 2024-08-22 PROCEDURE — 36415 COLL VENOUS BLD VENIPUNCTURE: CPT

## 2024-08-22 PROCEDURE — 85027 COMPLETE CBC AUTOMATED: CPT

## 2024-08-24 ENCOUNTER — TELEPHONE (OUTPATIENT)
Dept: FAMILY MEDICINE | Facility: CLINIC | Age: 59
End: 2024-08-24
Payer: COMMERCIAL

## 2024-08-24 DIAGNOSIS — R97.20 ELEVATED PROSTATE SPECIFIC ANTIGEN (PSA): Primary | ICD-10-CM

## 2024-08-24 NOTE — RESULT ENCOUNTER NOTE
Luther Washington,    I had the opportunity to review your recent labs and a summary of your labs reads as follows:    Your complete blood counts show no sign of anemia, normal white blood cell count and platelets.  Your comprehensive metabolic panel showed stable renal function, normal liver function,   Your hemoglobin A1c also shows stable average blood glucose   Your fasting lipid panel show  - normal HDL (good) cholesterol -as your goal is greater than 40  - low LDL (bad) cholesterol as your goal is less than 130   - normal triglyceride levels    Your PSA is elevated and I would recommend consult in urology          [facility] [address] [address]  Phone: [phone]  Fax: [phone]            Sincerely,  Gerson Lowery MD

## 2024-08-24 NOTE — TELEPHONE ENCOUNTER
Can we call Felix Coelho and let him know that     Luther Washington,    I had the opportunity to review your recent labs and a summary of your labs reads as follows:    Your complete blood counts show no sign of anemia, normal white blood cell count and platelets.  Your comprehensive metabolic panel showed stable renal function, normal liver function,   Your hemoglobin A1c also shows stable average blood glucose   Your fasting lipid panel show  - normal HDL (good) cholesterol -as your goal is greater than 40  - low LDL (bad) cholesterol as your goal is less than 130   - normal triglyceride levels    Your PSA is elevated and I would recommend consult in urology         [facility] [place]   [address] [place] [address]   [address]   [facility] [address]   Phone: [phone]   Fax: [phone]          Sincerely,  Gerson Lowery MD

## 2024-08-25 SDOH — HEALTH STABILITY: PHYSICAL HEALTH: ON AVERAGE, HOW MANY DAYS PER WEEK DO YOU ENGAGE IN MODERATE TO STRENUOUS EXERCISE (LIKE A BRISK WALK)?: 4 DAYS

## 2024-08-25 SDOH — HEALTH STABILITY: PHYSICAL HEALTH: ON AVERAGE, HOW MANY MINUTES DO YOU ENGAGE IN EXERCISE AT THIS LEVEL?: 20 MIN

## 2024-08-25 ASSESSMENT — SOCIAL DETERMINANTS OF HEALTH (SDOH): HOW OFTEN DO YOU GET TOGETHER WITH FRIENDS OR RELATIVES?: ONCE A WEEK

## 2024-08-26 ENCOUNTER — OFFICE VISIT (OUTPATIENT)
Dept: FAMILY MEDICINE | Facility: CLINIC | Age: 59
End: 2024-08-26
Payer: COMMERCIAL

## 2024-08-26 VITALS
DIASTOLIC BLOOD PRESSURE: 85 MMHG | RESPIRATION RATE: 16 BRPM | WEIGHT: 216 LBS | SYSTOLIC BLOOD PRESSURE: 138 MMHG | TEMPERATURE: 97.7 F | HEIGHT: 74 IN | HEART RATE: 84 BPM | BODY MASS INDEX: 27.72 KG/M2 | OXYGEN SATURATION: 97 %

## 2024-08-26 DIAGNOSIS — Z13.6 CARDIOVASCULAR SCREENING; LDL GOAL LESS THAN 130: ICD-10-CM

## 2024-08-26 DIAGNOSIS — Z00.00 ROUTINE GENERAL MEDICAL EXAMINATION AT A HEALTH CARE FACILITY: Primary | ICD-10-CM

## 2024-08-26 DIAGNOSIS — Z82.49 FAMILY HISTORY OF ISCHEMIC HEART DISEASE: ICD-10-CM

## 2024-08-26 DIAGNOSIS — R97.20 ELEVATED PSA: ICD-10-CM

## 2024-08-26 PROCEDURE — 99386 PREV VISIT NEW AGE 40-64: CPT | Performed by: INTERNAL MEDICINE

## 2024-08-26 ASSESSMENT — PAIN SCALES - GENERAL: PAINLEVEL: NO PAIN (0)

## 2024-08-26 NOTE — PROGRESS NOTES
Preventive Care Visit  [facility]  Gerson Lowery MD, MD, Internal Medicine  Aug 26, 2024        Dorene Washington is a 59 year old, presenting for the following:  Physical       Health Care Directive  Patient does not have a Health Care Directive or Living Will:     HPI        8/25/2024   General Health   How would you rate your overall physical health? Excellent   Feel stress (tense, anxious, or unable to sleep) Only a little      (!) STRESS CONCERN      8/25/2024   Nutrition   Three or more servings of calcium each day? Yes   Diet: Regular (no restrictions)   How many servings of fruit and vegetables per day? (!) 2-3   How many sweetened beverages each day? 0-1            8/25/2024   Exercise   Days per week of moderate/strenous exercise 4 days   Average minutes spent exercising at this level 20 min            8/25/2024   Social Factors   Frequency of gathering with friends or relatives Once a week   Worry food won't last until get money to buy more No   Food not last or not have enough money for food? No   Do you have housing? (Housing is defined as stable permanent housing and does not include staying ouside in a car, in a tent, in an abandoned building, in an overnight shelter, or couch-surfing.) Yes   Are you worried about losing your housing? No   Lack of transportation? No   Unable to get utilities (heat,electricity)? No            8/25/2024   Fall Risk   Fallen 2 or more times in the past year? No   Trouble with walking or balance? No             8/25/2024   Dental   Dentist two times every year? Yes            8/25/2024   TB Screening   Were you born outside of the [place]? No            Today's PHQ-2 Score:       8/25/2024     4:51 PM   PHQ-2 ( 1999 [employer])   Q1: Little interest or pleasure in doing things 0   Q2: Feeling down, depressed or hopeless 0   PHQ-2 Score 0   Q1: Little interest or pleasure in doing things Not at all   Q2: Feeling down, depressed or hopeless Not at all    PHQ-2 Score 0           8/25/2024   Substance Use   Alcohol more than 3/day or more than 7/wk No   Do you use any other substances recreationally? No        Social History     Tobacco Use    Smoking status: Never    Smokeless tobacco: Never   Substance Use Topics    Alcohol use: Yes     Comment: 5-7 drinks a week    Drug use: No           8/25/2024   STI Screening   New sexual partner(s) since last STI/HIV test? No      Last PSA:   PSA   Date Value Ref Range Status   08/25/2020 2.89 0 - 4 ug/L Final     Comment:     Assay Method:  Chemiluminescence using Siemens Vista analyzer     Prostate Specific Antigen Screen   Date Value Ref Range Status   08/22/2024 4.14 (H) 0.00 - 3.50 ng/mL Final     ASCVD Risk   The 10-year ASCVD risk score (Alyson IRIZARRY, et al., 2019) is: 8%    Values used to calculate the score:      Age: 59 years      Sex: Male      Is Non- : No      Diabetic: No      Tobacco smoker: No      Systolic Blood Pressure: 138 mmHg      Is BP treated: No      HDL Cholesterol: 49 mg/dL      Total Cholesterol: 208 mg/dL       Reviewed and updated as needed this visit by Provider                    Past Medical History:   Diagnosis Date    Chronic rhinitis 8/14/2003    External hemorrhoids 8/7/2008    Kidney stone 9/1/2009    Left inguinal hernia 5/25/2011    Positive Test for Malignant Hyperthermia 5/25/2011        CARDIOVASCULAR SCREENING; LDL GOAL LESS THAN 130  Family history of ischemic heart disease   Noted slight elevation of cholesterol and family history of coronary artery disease   Elevated blood pressure   Has noticed blood pressure readings are usually 130/80      Review of Systems  Constitutional, HEENT, cardiovascular, pulmonary, GI, , musculoskeletal, neuro, skin, endocrine and psych systems are negative, except as otherwise noted.     Objective    Exam  /85 (BP Location: Right arm, Patient Position: Sitting, Cuff Size: Adult Large)   Pulse 84   Temp 97.7  F  "(36.5  C)   Resp 16   Ht 1.87 m (6' 1.62\")   Wt 98 kg (216 lb)   SpO2 97%   BMI 28.02 kg/m     Estimated body mass index is 28.02 kg/m  as calculated from the following:    Height as of this encounter: 1.87 m (6' 1.62\").    Weight as of this encounter: 98 kg (216 lb).    Physical Exam  GENERAL: alert and no distress  EYES: Eyes grossly normal to inspection,    HENT: ear canals and TM's normal,   NECK: no adenopathy, no asymmetry, masses, or scars  RESP: lungs clear to auscultation - no rales, rhonchi or wheezes  CV: regular rate and rhythm, normal S1 S2, no S3 or S4, no murmur   ABDOMEN: soft, nontender, no hepatosplenomegaly, no masses and bowel sounds normal  MS: no gross musculoskeletal defects noted, no edema  SKIN: no suspicious lesions or rashes  NEURO: Normal strength and tone, mentation intact and speech normal  PSYCH: mentation appears normal, affect normal/bright    Patient Instructions   (Z00.00) Routine general medical examination at a health care facility  (primary encounter diagnosis)  Comment: For routine exam, we reviewed labs as ordered, cholesterol, diabetes mellitus check, liver function, renal function, PSA.  We will also update vaccination history.  Plan:     (Z13.6) CARDIOVASCULAR SCREENING; LDL GOAL LESS THAN 130  Comment: Noted stable choelsterol, however given family history and cardiac risk score we will refer for a CT coronary calcium scan [facility] - [phone]   Plan:     (Z82.49) Family history of ischemic heart disease  Comment: as above   Plan:     (R97.20) Elevated PSA  Comment: Also recommend consult in urology    [facility] [place]   [address]   [address]   [facility] [address]   Phone: [phone]   Fax: [phone]        Plan:           Signed Electronically by: Gerson Lowery MD, MD    "

## 2024-08-26 NOTE — TELEPHONE ENCOUNTER
Patient Contact     Attempt # 1     Was call answered?  No.  Left message on voicemail with information to call triage back. Please inform pt about results from provider's note.        Heriberto Berger RN  [facility]

## 2024-08-26 NOTE — PATIENT INSTRUCTIONS
(Z00.00) Routine general medical examination at a health care facility  (primary encounter diagnosis)  Comment: For routine exam, we reviewed labs as ordered, cholesterol, diabetes mellitus check, liver function, renal function, PSA.  We will also update vaccination history.  Plan:     (Z13.6) CARDIOVASCULAR SCREENING; LDL GOAL LESS THAN 130  Comment: Noted stable choelsterol, however given family history and cardiac risk score we will refer for a CT coronary calcium scan [facility] - [phone]   Plan:     (Z82.49) Family history of ischemic heart disease  Comment: as above   Plan:     (R97.20) Elevated PSA  Comment: Also recommend consult in urology    [facility] [place] [id number]   [address]   [facility] [address]   Phone: [phone]   Fax: [phone]        Plan:

## 2024-08-27 NOTE — TELEPHONE ENCOUNTER
Pt called back. Pt had office visit yesterday and reviewed the results with provider yesterday.     Ria Ya RN

## 2024-08-27 NOTE — TELEPHONE ENCOUNTER
Patient Contact     Attempt # 2     Was call answered?  No.  Left message on voicemail with information to call triage back.     Active user on , will send a message there. Post-pone to make sure that pt reads the message.    Heriberto Berger RN  [facility]

## 2024-09-06 ENCOUNTER — VIRTUAL VISIT (OUTPATIENT)
Dept: UROLOGY | Facility: CLINIC | Age: 59
End: 2024-09-06
Attending: INTERNAL MEDICINE
Payer: COMMERCIAL

## 2024-09-06 VITALS — BODY MASS INDEX: 26.95 KG/M2 | HEIGHT: 74 IN | WEIGHT: 210 LBS

## 2024-09-06 DIAGNOSIS — R97.20 ELEVATED PROSTATE SPECIFIC ANTIGEN (PSA): ICD-10-CM

## 2024-09-06 PROCEDURE — 99204 OFFICE O/P NEW MOD 45 MIN: CPT | Mod: 95 | Performed by: UROLOGY

## 2024-09-06 ASSESSMENT — PAIN SCALES - GENERAL: PAINLEVEL: NO PAIN (0)

## 2024-09-06 NOTE — LETTER
9/6/2024       RE: Felix Coelho  [address]  [facility] [address]     Dear Colleague,    Thank you for referring your patient, Felix Coelho, to the [facility] [place] at [facility]. Please see a copy of my visit note below.    Virtual Visit Details    Type of service:  Video Visit     Originating Location (pt. Location): Home    Distant Location (provider location):  On-site  Platform used for Video Visit: Gina            Chief Complaint:   Elevated PSA          Consult or Referral:     Mr. Felix Coelho is a 59 year old male seen in consultation from Dr. Lowery.         History of Present Illness:    Felix Coelho is a very pleasant 59 year old male who presents with elevated PSA.  He denies any significant urinary issues.  He recalls an elevated PSA in his father but he does not believe that it was ever worked up.  ECOG 0       Past Medical History:     Past Medical History:   Diagnosis Date     Chronic rhinitis 8/14/2003     External hemorrhoids 8/7/2008     Kidney stone 9/1/2009     Left inguinal hernia 5/25/2011     Positive Test for Malignant Hyperthermia 5/25/2011            Past Surgical History:     Past Surgical History:   Procedure Laterality Date     BIOPSY  1999    thigh muscle, contracture test for Malignant Hyperthermia, +     EYE SURGERY  1996    PRK for vision care     GENITOURINARY SURGERY  2010    vasectomy     HERNIA REPAIR  2011            Medications     Current Outpatient Medications   Medication Sig Dispense Refill     fexofenadine-pseudoePHEDrine (ALLEGRA-D ALLERGY & CONGESTION)  MG 12 hr tablet TAKE 1 TABLET BY MOUTH DAILY PRN 90 tablet 3     Ibuprofen 200 MG capsule Take 200 mg by mouth as needed for fever.       Multiple Vitamin (MULTIVITAMINS PO) Take 1 tablet by mouth three times a week.       naproxen sodium (ALEVE) 220 MG tablet Take 440 mg by mouth every 12 hours  as needed.       triamcinolone (KENALOG) 0.1 % paste USE AS DIRECTED FOR CANKER SORES AS NEEDED 15 g 2     No current facility-administered medications for this visit.            Family History:     Family History   Problem Relation Age of Onset     C.A.D. Father 55        triple bi pass surgery  at age 55     Coronary Artery Disease Father         triple bypass surgery, death from heart attack     Hypertension Father      Hyperlipidemia Father      Anesthesia Reaction Father         Malignant Hyperthermia     Depression Sister      Cancer Maternal Grandfather         neck     Cancer Paternal Grandfather         penile     Cardiovascular Paternal Uncle         malignant hyperthermia            Social History:     Social History     Socioeconomic History     Marital status:      Spouse name: Not on file     Number of children: Not on file     Years of education: Not on file     Highest education level: Not on file   Occupational History     Not on file   Tobacco Use     Smoking status: Never     Smokeless tobacco: Never   Substance and Sexual Activity     Alcohol use: Yes     Comment: 5-7 drinks a week     Drug use: No     Sexual activity: Yes     Partners: Female     Birth control/protection: Male Surgical   Other Topics Concern     Parent/sibling w/ CABG, MI or angioplasty before 65F 55M? 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     Social Determinants of Health     Financial Resource Strain: Low Risk  (8/25/2024)    Financial Resource Strain      Within the past 12 months, have you or your family members you live with been unable to get utilities (heat, electricity) when it was really needed?: No   Food Insecurity: Low Risk  (8/25/2024)    Food Insecurity      Within the past 12 months, did you worry that your food would run out before you got money to  buy more?: No      Within the past 12 months, did the food you bought just not last and you didn t have money to get more?: No   Transportation Needs: Low Risk  (8/25/2024)    Transportation Needs      Within the past 12 months, has lack of transportation kept you from medical appointments, getting your medicines, non-medical meetings or appointments, work, or from getting things that you need?: No   Physical Activity: Insufficiently Active (8/25/2024)    Exercise Vital Sign      Days of Exercise per Week: 4 days      Minutes of Exercise per Session: 20 min   Stress: No Stress Concern Present (8/25/2024)    [employer] [place] [employer] - Occupational Stress Questionnaire      Feeling of Stress : Only a little   Social Connections: Unknown (8/25/2024)    Social Connection and Isolation Panel [NHANES]      Frequency of Communication with Friends and Family: Not on file      Frequency of Social Gatherings with Friends and Family: Once a week      Attends Yazidism Services: Not on file      Active Member of Clubs or Organizations: Not on file      Attends Club or Organization Meetings: Not on file      Marital Status: Not on file   Interpersonal Safety: Low Risk  (8/26/2024)    Interpersonal Safety      Do you feel physically and emotionally safe where you currently live?: Yes      Within the past 12 months, have you been hit, slapped, kicked or otherwise physically hurt by someone?: No      Within the past 12 months, have you been humiliated or emotionally abused in other ways by your partner or ex-partner?: No   Housing Stability: Low Risk  (8/25/2024)    Housing Stability      Do you have housing? : Yes      Are you worried about losing your housing?: No            Allergies:   Halothane         Review of Systems     10 point ROS of systems including Constitutional, Eyes, Respiratory, Cardiovascular, Gastroenterology, Genitourinary, Integumentary, Muscularskeletal, Psychiatric were all negative except  for pertinent positives noted in my HPI.          Physical Exam:   Vitals:  No vitals were obtained today due to virtual visit.    Physical Exam   EYES: Eyes grossly normal to inspection.  No discharge or erythema, or obvious scleral/conjunctival abnormalities.  SKIN: Visible skin clear. No significant rash, abnormal pigmentation or lesions.  NEURO: Cranial nerves grossly intact.  Mentation and speech appropriate for age.  GENERAL: Healthy, alert and no distress  RESP: No audible wheeze, cough, or visible cyanosis.  No visible retractions or increased work of breathing.    PSYCH: Mentation appears normal, affect normal/bright, judgement and insight intact, normal speech and appearance well-groomed.        Labs and Pathology:    I reviewed all applicable laboratory and pathology data and went over findings with patient  Significant for mildly elevated PSA    Lab Results   Component Value Date/Time    PSA 4.14 (H) 08/22/2024 07:38 AM    PSA 2.89 08/25/2020 08:15 AM    PSA 2.90 04/27/2018 08:13 AM    PSA 2.05 11/04/2016 07:18 AM    PSA 2.43 08/14/2015 08:01 AM    PSA 2.12 07/30/2013 07:14 PM    PSA 2.01 07/13/2011 09:03 AM    PSA 1.99 07/01/2009 10:26 AM               Imaging:    No relevant imaging to review today          Assessment and Plan:     Assessment:  59-year-old male with elevated PSA     We had a long discussion about the utility of PSA screening.  We talked about the Pros and Cons.  We discussed the findings of the PLCO and EORTC studies.  We discussed the results of the [place] trial of treatment vs. observation in clinically detected prostate cancer as well as the results of the PIVOT trial in the context of screen-detected cancer.  We discussed the recommendations by the AUA, and USPSTF.     We also discussed the emerging role of MRI in the diagnosis of prostate cancer and the potential for performing MRI-Ultrasound Fusion biopsy if suspicious lesions are noted.     Patient has decided he would  like to proceed with prostate MRI      Plan:  Schedule for prostate MRI   Call with the results to discuss the next steps     45 total minutes spent on the date of the encounter including direct interaction with the patient, performing chart review, documentation and further activities as noted above.        Steffany Hsieh MD   Department of Urology   [facility]       Again, thank you for allowing me to participate in the care of your patient.      Sincerely,    Steffany Hsieh MD

## 2024-09-06 NOTE — PROGRESS NOTES
Virtual Visit Details    Type of service:  Video Visit     Originating Location (pt. Location): Home    Distant Location (provider location):  On-site  Platform used for Video Visit: Gina            Chief Complaint:   Elevated PSA          Consult or Referral:     Mr. Felix Coelho is a 59 year old male seen in consultation from Dr. Lowery.         History of Present Illness:    Felix Coelho is a very pleasant 59 year old male who presents with elevated PSA.  He denies any significant urinary issues.  He recalls an elevated PSA in his father but he does not believe that it was ever worked up.  ECOG 0       Past Medical History:     Past Medical History:   Diagnosis Date    Chronic rhinitis 8/14/2003    External hemorrhoids 8/7/2008    Kidney stone 9/1/2009    Left inguinal hernia 5/25/2011    Positive Test for Malignant Hyperthermia 5/25/2011            Past Surgical History:     Past Surgical History:   Procedure Laterality Date    BIOPSY  1999    thigh muscle, contracture test for Malignant Hyperthermia, +    EYE SURGERY  1996    PRK for vision care    GENITOURINARY SURGERY  2010    vasectomy    HERNIA REPAIR  2011            Medications     Current Outpatient Medications   Medication Sig Dispense Refill    fexofenadine-pseudoePHEDrine (ALLEGRA-D ALLERGY & CONGESTION)  MG 12 hr tablet TAKE 1 TABLET BY MOUTH DAILY PRN 90 tablet 3    Ibuprofen 200 MG capsule Take 200 mg by mouth as needed for fever.      Multiple Vitamin (MULTIVITAMINS PO) Take 1 tablet by mouth three times a week.      naproxen sodium (ALEVE) 220 MG tablet Take 440 mg by mouth every 12 hours as needed.      triamcinolone (KENALOG) 0.1 % paste USE AS DIRECTED FOR CANKER SORES AS NEEDED 15 g 2     No current facility-administered medications for this visit.            Family History:     Family History   Problem Relation Age of Onset    C.A.D. Father 55        triple bi pass surgery  at age 55    Coronary Artery Disease  Father         triple bypass surgery, death from heart attack    Hypertension Father     Hyperlipidemia Father     Anesthesia Reaction Father         Malignant Hyperthermia    Depression Sister     Cancer Maternal Grandfather         neck    Cancer Paternal Grandfather         penile    Cardiovascular Paternal Uncle         malignant hyperthermia            Social History:     Social History     Socioeconomic History    Marital status:      Spouse name: Not on file    Number of children: Not on file    Years of education: Not on file    Highest education level: Not on file   Occupational History    Not on file   Tobacco Use    Smoking status: Never    Smokeless tobacco: Never   Substance and Sexual Activity    Alcohol use: Yes     Comment: 5-7 drinks a week    Drug use: No    Sexual activity: Yes     Partners: Female     Birth control/protection: Male Surgical   Other Topics Concern    Parent/sibling w/ CABG, MI or angioplasty before 65F 55M? 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     Social Determinants of Health     Financial Resource Strain: Low Risk  (8/25/2024)    Financial Resource Strain     Within the past 12 months, have you or your family members you live with been unable to get utilities (heat, electricity) when it was really needed?: No   Food Insecurity: Low Risk  (8/25/2024)    Food Insecurity     Within the past 12 months, did you worry that your food would run out before you got money to buy more?: No     Within the past 12 months, did the food you bought just not last and you didn t have money to get more?: No   Transportation Needs: Low Risk  (8/25/2024)    Transportation Needs     Within the past 12 months, has lack of transportation kept you from medical appointments, getting your medicines, non-medical meetings or appointments, work,  or from getting things that you need?: No   Physical Activity: Insufficiently Active (8/25/2024)    Exercise Vital Sign     Days of Exercise per Week: 4 days     Minutes of Exercise per Session: 20 min   Stress: No Stress Concern Present (8/25/2024)    [employer] [employer]ational [employer] - Occupational Stress Questionnaire     Feeling of Stress : Only a little   Social Connections: Unknown (8/25/2024)    Social Connection and Isolation Panel [NHANES]     Frequency of Communication with Friends and Family: Not on file     Frequency of Social Gatherings with Friends and Family: Once a week     Attends Evangelical Services: Not on file     Active Member of Clubs or Organizations: Not on file     Attends Club or Organization Meetings: Not on file     Marital Status: Not on file   Interpersonal Safety: Low Risk  (8/26/2024)    Interpersonal Safety     Do you feel physically and emotionally safe where you currently live?: Yes     Within the past 12 months, have you been hit, slapped, kicked or otherwise physically hurt by someone?: No     Within the past 12 months, have you been humiliated or emotionally abused in other ways by your partner or ex-partner?: No   Housing Stability: Low Risk  (8/25/2024)    Housing Stability     Do you have housing? : Yes     Are you worried about losing your housing?: No            Allergies:   Halothane         Review of Systems     10 point ROS of systems including Constitutional, Eyes, Respiratory, Cardiovascular, Gastroenterology, Genitourinary, Integumentary, Muscularskeletal, Psychiatric were all negative except for pertinent positives noted in my HPI.          Physical Exam:   Vitals:  No vitals were obtained today due to virtual visit.    Physical Exam   EYES: Eyes grossly normal to inspection.  No discharge or erythema, or obvious scleral/conjunctival abnormalities.  SKIN: Visible skin clear. No significant rash, abnormal pigmentation or lesions.  NEURO: Cranial nerves grossly  intact.  Mentation and speech appropriate for age.  GENERAL: Healthy, alert and no distress  RESP: No audible wheeze, cough, or visible cyanosis.  No visible retractions or increased work of breathing.    PSYCH: Mentation appears normal, affect normal/bright, judgement and insight intact, normal speech and appearance well-groomed.        Labs and Pathology:    I reviewed all applicable laboratory and pathology data and went over findings with patient  Significant for mildly elevated PSA    Lab Results   Component Value Date/Time    PSA 4.14 (H) 08/22/2024 07:38 AM    PSA 2.89 08/25/2020 08:15 AM    PSA 2.90 04/27/2018 08:13 AM    PSA 2.05 11/04/2016 07:18 AM    PSA 2.43 08/14/2015 08:01 AM    PSA 2.12 07/30/2013 07:14 PM    PSA 2.01 07/13/2011 09:03 AM    PSA 1.99 07/01/2009 10:26 AM               Imaging:    No relevant imaging to review today          Assessment and Plan:     Assessment:  59-year-old male with elevated PSA     We had a long discussion about the utility of PSA screening.  We talked about the Pros and Cons.  We discussed the findings of the PLCO and EORTC studies.  We discussed the results of the [place] trial of treatment vs. observation in clinically detected prostate cancer as well as the results of the PIVOT trial in the context of screen-detected cancer.  We discussed the recommendations by the AUA, and USPSTF.     We also discussed the emerging role of MRI in the diagnosis of prostate cancer and the potential for performing MRI-Ultrasound Fusion biopsy if suspicious lesions are noted.     Patient has decided he would like to proceed with prostate MRI      Plan:  Schedule for prostate MRI   Call with the results to discuss the next steps     45 total minutes spent on the date of the encounter including direct interaction with the patient, performing chart review, documentation and further activities as noted above.        Steffany Hsieh MD   Department of Urology   [facility]

## 2024-09-06 NOTE — NURSING NOTE
Current patient location: Patient declined to provide     Is the patient currently in the state of [place]? YES    Visit mode:VIDEO    If the visit is dropped, the patient can be reconnected by: VIDEO VISIT: Send to e-mail at: [email or web address]    Will anyone else be joining the visit? NO  (If patient encounters technical issues they should call [phone]    How would you like to obtain your AVS? MyChart    Are changes needed to the allergy or medication list? No    Are refills needed on medications prescribed by this physician? NO    Rooming Documentation:  Questionnaire(s) completed      Reason for visit: Consult    Ayesha NIXON

## 2024-09-27 ENCOUNTER — HOSPITAL ENCOUNTER (OUTPATIENT)
Dept: CARDIOLOGY | Facility: CLINIC | Age: 59
Discharge: HOME | End: 2024-09-27
Attending: INTERNAL MEDICINE | Admitting: INTERNAL MEDICINE
Payer: COMMERCIAL

## 2024-09-27 DIAGNOSIS — Z82.49 FAMILY HISTORY OF ISCHEMIC HEART DISEASE: ICD-10-CM

## 2024-09-27 DIAGNOSIS — Z13.6 CARDIOVASCULAR SCREENING; LDL GOAL LESS THAN 130: ICD-10-CM

## 2024-09-27 PROCEDURE — 75571 CT HRT W/O DYE W/CA TEST: CPT

## 2024-09-27 PROCEDURE — 75571 CT HRT W/O DYE W/CA TEST: CPT | Mod: 26 | Performed by: INTERNAL MEDICINE

## 2024-09-29 DIAGNOSIS — E78.5 HYPERLIPIDEMIA LDL GOAL <130: Primary | ICD-10-CM

## 2024-09-30 ENCOUNTER — MYC MEDICAL ADVICE (OUTPATIENT)
Dept: FAMILY MEDICINE | Facility: CLINIC | Age: 59
End: 2024-09-30
Payer: COMMERCIAL

## 2024-09-30 NOTE — TELEPHONE ENCOUNTER
Can we call Felix Coelho and let him know that       Luther Washington,    I have had the opportunity to review your recent results and an interpretation is as follows:  Your follow up CT does show evidence of coronary calcium and we can should consider statin - atorvastatin 20 mg daily and recheck fasting lipid panel and liver function tests in 3 months     Sincerely,  Gerson Lowery MD

## 2024-09-30 NOTE — RESULT ENCOUNTER NOTE
Luther Washington,    I have had the opportunity to review your recent results and an interpretation is as follows:  Your follow up CT does show evidence of coronary calcium and we can should consider statin - atorvastatin 20 mg daily and recheck fasting lipid panel and liver function tests in 3 months     Sincerely,  Gerson Lowery MD

## 2024-09-30 NOTE — TELEPHONE ENCOUNTER
Patient Contact    Attempt # 1    Was call answered?  No.  Left message on voicemail with information to call me back.    Upon callback, please review PCP message for pt below and assist with scheduling requested followup.    Yolande Davies RN

## 2024-10-01 RX ORDER — ATORVASTATIN CALCIUM 20 MG/1
20 TABLET, FILM COATED ORAL DAILY
Qty: 90 TABLET | Refills: 3 | Status: SHIPPED | OUTPATIENT
Start: 2024-10-01

## 2024-10-01 NOTE — TELEPHONE ENCOUNTER
Pt agrees with provider's recommendation on starting a statin. Pharmacy pended for your review if appropriate. Please advise.     Heriberto Berger RN  [facility]

## 2024-10-01 NOTE — TELEPHONE ENCOUNTER
Dr. Sebastián Davila Pt has seen note via [facility]:        Orders pended for your review.    Yolande Davies RN

## 2024-10-02 NOTE — TELEPHONE ENCOUNTER
Pt called the clinic back. Relayed message from below. Pt stated he already got that message. Pt stated he will schedule the follow up labs via .

## 2024-10-16 ENCOUNTER — ANCILLARY PROCEDURE (OUTPATIENT)
Dept: MRI IMAGING | Facility: CLINIC | Age: 59
End: 2024-10-16
Attending: UROLOGY
Payer: COMMERCIAL

## 2024-10-16 DIAGNOSIS — R97.20 ELEVATED PROSTATE SPECIFIC ANTIGEN (PSA): ICD-10-CM

## 2024-10-16 PROCEDURE — 72197 MRI PELVIS W/O & W/DYE: CPT | Performed by: RADIOLOGY

## 2024-10-16 PROCEDURE — A9585 GADOBUTROL INJECTION: HCPCS | Mod: JZ | Performed by: RADIOLOGY

## 2024-10-16 RX ORDER — GADOBUTROL 604.72 MG/ML
10 INJECTION INTRAVENOUS ONCE
Status: COMPLETED | OUTPATIENT
Start: 2024-10-16 | End: 2024-10-16

## 2024-10-16 RX ADMIN — GADOBUTROL 10 ML: 604.72 INJECTION INTRAVENOUS at 16:21

## 2024-12-11 ENCOUNTER — LAB (OUTPATIENT)
Dept: LAB | Facility: CLINIC | Age: 59
End: 2024-12-11
Payer: COMMERCIAL

## 2024-12-11 DIAGNOSIS — E78.5 HYPERLIPIDEMIA LDL GOAL <130: ICD-10-CM

## 2024-12-11 LAB
ALBUMIN SERPL BCG-MCNC: 4.1 G/DL (ref 3.5–5.2)
ALP SERPL-CCNC: 66 U/L (ref 40–150)
ALT SERPL W P-5'-P-CCNC: 20 U/L (ref 0–70)
AST SERPL W P-5'-P-CCNC: 26 U/L (ref 0–45)
BILIRUB DIRECT SERPL-MCNC: <0.2 MG/DL (ref 0–0.3)
BILIRUB SERPL-MCNC: 0.5 MG/DL
CHOLEST SERPL-MCNC: 139 MG/DL
FASTING STATUS PATIENT QL REPORTED: YES
HDLC SERPL-MCNC: 50 MG/DL
LDLC SERPL CALC-MCNC: 72 MG/DL
NONHDLC SERPL-MCNC: 89 MG/DL
PROT SERPL-MCNC: 6.7 G/DL (ref 6.4–8.3)
TRIGL SERPL-MCNC: 83 MG/DL

## 2024-12-11 PROCEDURE — 80076 HEPATIC FUNCTION PANEL: CPT

## 2024-12-11 PROCEDURE — 36415 COLL VENOUS BLD VENIPUNCTURE: CPT

## 2024-12-11 PROCEDURE — 80061 LIPID PANEL: CPT

## 2025-06-29 DIAGNOSIS — E78.5 HYPERLIPIDEMIA LDL GOAL <130: ICD-10-CM

## 2025-06-30 RX ORDER — ATORVASTATIN CALCIUM 20 MG/1
20 TABLET, FILM COATED ORAL DAILY
Qty: 90 TABLET | Refills: 0 | Status: SHIPPED | OUTPATIENT
Start: 2025-06-30

## 2025-07-28 ENCOUNTER — PATIENT OUTREACH (OUTPATIENT)
Dept: CARE COORDINATION | Facility: CLINIC | Age: 60
End: 2025-07-28
Payer: COMMERCIAL

## 2025-08-11 ENCOUNTER — PATIENT OUTREACH (OUTPATIENT)
Dept: CARE COORDINATION | Facility: CLINIC | Age: 60
End: 2025-08-11
Payer: COMMERCIAL